# Patient Record
Sex: FEMALE | Race: WHITE | NOT HISPANIC OR LATINO | Employment: FULL TIME | URBAN - METROPOLITAN AREA
[De-identification: names, ages, dates, MRNs, and addresses within clinical notes are randomized per-mention and may not be internally consistent; named-entity substitution may affect disease eponyms.]

---

## 2017-02-07 DIAGNOSIS — F51.04 CHRONIC INSOMNIA: Primary | ICD-10-CM

## 2017-02-07 RX ORDER — ZOLPIDEM TARTRATE 5 MG/1
TABLET ORAL
Qty: 90 TABLET | Refills: 1 | Status: CANCELLED | OUTPATIENT
Start: 2017-02-07

## 2017-02-07 NOTE — TELEPHONE ENCOUNTER
Routing refill request to provider for review/approval because:  Drug not on the [facility] refill protocol     Ksenia Garcia RN, BSN  [facility]

## 2017-02-07 NOTE — TELEPHONE ENCOUNTER
Controlled Substance Refill Request for zolpidem (AMBIEN) 5 MG tablet  Problem List Complete:  Yes    Last Written Prescription Date:  7.25.16  Last Fill Quantity: 90,   # refills: 1    Last Office Visit with [facility] primary care provider: 5.4.16    Clinic visit frequency required: na     Future Office visit:     Controlled substance agreement on file: No.     Processing:  Fax Rx to listed pharmacy   checked in past 6 months?  Yes 9.23.13

## 2017-02-09 NOTE — TELEPHONE ENCOUNTER
Attempt #1.    The patient indicates understanding of these issues and agrees with the plan.  Scheduled in EP clinic for OV.  Sera Zepeda RN

## 2017-02-13 DIAGNOSIS — F32.1 MAJOR DEPRESSIVE DISORDER, SINGLE EPISODE, MODERATE (H): ICD-10-CM

## 2017-02-13 NOTE — TELEPHONE ENCOUNTER
sertraline (ZOLOFT) 100 MG tablet     Last Written Prescription Date: 8.18.16  Last Fill Quantity: 90, # refills: 1  Last Office Visit with [facility] primary care provider:  5.4.16   Next 5 appointments (look out 90 days)     Feb 14, 2017 10:40 AM CST   Office Visit with Aurelio Rivera MD   [facility] ([facility])    [address] [place] [id number] [phone]                   Last PHQ-9 score on record=   PHQ-9 SCORE 7/19/2016   Total Score -   Total Score 6

## 2017-02-14 ENCOUNTER — OFFICE VISIT (OUTPATIENT)
Dept: FAMILY MEDICINE | Facility: CLINIC | Age: 57
End: 2017-02-14
Payer: COMMERCIAL

## 2017-02-14 VITALS
WEIGHT: 189 LBS | BODY MASS INDEX: 34.78 KG/M2 | HEIGHT: 62 IN | OXYGEN SATURATION: 99 % | HEART RATE: 71 BPM | TEMPERATURE: 97.2 F | DIASTOLIC BLOOD PRESSURE: 84 MMHG | SYSTOLIC BLOOD PRESSURE: 126 MMHG

## 2017-02-14 DIAGNOSIS — F32.1 MAJOR DEPRESSIVE DISORDER, SINGLE EPISODE, MODERATE (H): Primary | ICD-10-CM

## 2017-02-14 DIAGNOSIS — E66.9 NON MORBID OBESITY, UNSPECIFIED OBESITY TYPE: ICD-10-CM

## 2017-02-14 DIAGNOSIS — Z12.31 ENCOUNTER FOR SCREENING MAMMOGRAM FOR BREAST CANCER: ICD-10-CM

## 2017-02-14 DIAGNOSIS — F51.04 CHRONIC INSOMNIA: ICD-10-CM

## 2017-02-14 PROCEDURE — 99214 OFFICE O/P EST MOD 30 MIN: CPT | Performed by: FAMILY MEDICINE

## 2017-02-14 RX ORDER — ZOLPIDEM TARTRATE 5 MG/1
2.5 TABLET ORAL
Qty: 45 TABLET | Refills: 1 | Status: SHIPPED | OUTPATIENT
Start: 2017-02-14 | End: 2017-03-14

## 2017-02-14 RX ORDER — SERTRALINE HYDROCHLORIDE 100 MG/1
150 TABLET, FILM COATED ORAL DAILY
Qty: 135 TABLET | Refills: 1 | Status: SHIPPED | OUTPATIENT
Start: 2017-02-14 | End: 2017-07-29

## 2017-02-14 ASSESSMENT — ANXIETY QUESTIONNAIRES
3. WORRYING TOO MUCH ABOUT DIFFERENT THINGS: NOT AT ALL
1. FEELING NERVOUS, ANXIOUS, OR ON EDGE: SEVERAL DAYS
2. NOT BEING ABLE TO STOP OR CONTROL WORRYING: NOT AT ALL
GAD7 TOTAL SCORE: 2
6. BECOMING EASILY ANNOYED OR IRRITABLE: NOT AT ALL
5. BEING SO RESTLESS THAT IT IS HARD TO SIT STILL: NOT AT ALL
7. FEELING AFRAID AS IF SOMETHING AWFUL MIGHT HAPPEN: SEVERAL DAYS

## 2017-02-14 ASSESSMENT — PATIENT HEALTH QUESTIONNAIRE - PHQ9: 5. POOR APPETITE OR OVEREATING: NOT AT ALL

## 2017-02-14 NOTE — MR AVS SNAPSHOT
After Visit Summary   2/14/2017    Allyn Thurman    MRN: [id number]           Patient Information     Date Of Birth          1960        Visit Information        Provider Department      2/14/2017 10:40 AM Aurelio Rivera MD [facility] [place]        Today's Diagnoses     Non morbid obesity, unspecified obesity type    -  1    Moderate Depression [296.22]        Chronic insomnia           Follow-ups after your visit        Additional Services     ENDOCRINOLOGY ADULT REFERRAL       Your provider has referred you to: [facility]: [facility] [phone]   [email or web address]      Please be aware that coverage of these services is subject to the terms and limitations of your health insurance plan.  Call member services at your health plan with any benefit or coverage questions.      Please bring the following to your appointment:    >>   Any x-rays, CTs or MRIs which have been performed.  Contact the facility where they were done to arrange for  prior to your scheduled appointment.    >>   List of current medications   >>   This referral request   >>   Any documents/labs given to you for this referral                  Follow-up notes from your care team     Return in about 4 weeks (around 3/14/2017) for Physical Exam.      Who to contact     If you have questions or need follow up information about today's clinic visit or your schedule please contact [facility] [place] directly at [phone].  Normal or non-critical lab and imaging results will be communicated to you by MyChart, letter or phone within 4 business days after the clinic has received the results. If you do not hear from us within 7 days, please contact the clinic through MyChart or phone. If you have a critical or abnormal lab result, we will notify you by phone as soon as possible.  Submit refill requests through [employer] or call your pharmacy and they will  "forward the refill request to us. Please allow 3 business days for your refill to be completed.          Additional Information About Your Visit        [employer] Information     [employer] gives you secure access to your electronic health record. If you see a primary care provider, you can also send messages to your care team and make appointments. If you have questions, please call your primary care clinic.  If you do not have a primary care provider, please call [phone] and they will assist you.        Care EveryWhere ID     This is your Care EveryWhere ID. This could be used by other organizations to access your [facility] medical records  [id number]        Your Vitals Were     Pulse Temperature Height Last Period Pulse Oximetry Breastfeeding?    71 97.2  F (36.2  C) (Tympanic) 5' 2\" (1.575 m) 01/03/2014 99% No    BMI (Body Mass Index)                   34.57 kg/m2            Blood Pressure from Last 3 Encounters:   02/14/17 126/84   05/04/16 128/76   01/20/16 110/60    Weight from Last 3 Encounters:   02/14/17 189 lb (85.7 kg)   05/04/16 182 lb (82.6 kg)   01/20/16 181 lb (82.1 kg)              We Performed the Following     ENDOCRINOLOGY ADULT REFERRAL          Today's Medication Changes          These changes are accurate as of: 2/14/17 11:06 AM.  If you have any questions, ask your nurse or doctor.               These medicines have changed or have updated prescriptions.        Dose/Directions    sertraline 100 MG tablet   Commonly known as:  ZOLOFT   This may have changed:  See the new instructions.   Used for:  Major depressive disorder, single episode, moderate (H)   Changed by:  Aurelio Rivera MD        Dose:  150 mg   Take 1.5 tablets (150 mg) by mouth daily   Quantity:  135 tablet   Refills:  1       zolpidem 5 MG tablet   Commonly known as:  AMBIEN   This may have changed:  See the new instructions.   Used for:  Chronic insomnia   Changed by:  Aurelio Rivera MD        Dose:  2.5 mg   Take 0.5 tablets " (2.5 mg) by mouth nightly as needed for sleep   Quantity:  45 tablet   Refills:  1            Where to get your medicines      These medications were sent to [facility]/pharmacy #[id number] - [place], [place] - [address]  [address] [place] [address]     Phone:  [phone]     sertraline 100 MG tablet         Some of these will need a paper prescription and others can be bought over the counter.  Ask your nurse if you have questions.     Bring a paper prescription for each of these medications     zolpidem 5 MG tablet                Primary Care Provider Office Phone # Fax #    Jovana Montes PA-C [phone]       [address] [address]        Thank you!     Thank you for choosing [facility]  for your care. Our goal is always to provide you with excellent care. Hearing back from our patients is one way we can continue to improve our services. Please take a few minutes to complete the written survey that you may receive in the mail after your visit with us. Thank you!             Your Updated Medication List - Protect others around you: Learn how to safely use, store and throw away your medicines at www.disposemymeds.org.          This list is accurate as of: 2/14/17 11:06 AM.  Always use your most recent med list.                   Brand Name Dispense Instructions for use    B Complex Tabs      Take 1 tablet by mouth daily.       CENTRUM Tabs      Take 1 tablet by mouth daily.       cetirizine 10 MG tablet    zyrTEC    30 tablet    Take 1 tablet (10 mg) by mouth every morning       ferrous sulfate Dried 160 (50 FE) MG tablet     30 tablet    Take 1 tablet by mouth daily (with breakfast).       fluticasone 50 MCG/ACT spray    FLONASE    48 g    USE 2 SPRAYS IN EACH NOSTRIL ONCE DAILY       METAMUCIL SMOOTH TEXTURE 63 % Powd   Generic drug:  psyllium     1 Bottle    Take 3 teaspoonful by mouth daily. Mix in 8 ounces of water        pantoprazole 40 MG EC tablet    PROTONIX    90 tablet    Take 1 tablet (40 mg) by mouth daily Take 30-60 minutes before a meal.       sertraline 100 MG tablet    ZOLOFT    135 tablet    Take 1.5 tablets (150 mg) by mouth daily       SUMAtriptan 50 MG tablet    IMITREX    9 tablet    Take 1 tablet (50 mg) by mouth at onset of headache for migraine May repeat dose in 2 hours if needed, max 4 tabs per 24 hours       vitamin D 2000 UNITS tablet      Take 1 tablet by mouth daily.       zolpidem 5 MG tablet    AMBIEN    45 tablet    Take 0.5 tablets (2.5 mg) by mouth nightly as needed for sleep

## 2017-02-14 NOTE — NURSING NOTE
"Chief Complaint   Patient presents with     Recheck Medication       Initial /84 (BP Location: Right arm, Patient Position: Chair, Cuff Size: Adult Regular)  Pulse 71  Temp 97.2  F (36.2  C) (Tympanic)  Ht 5' 2\" (1.575 m)  Wt 189 lb (85.7 kg)  LMP 01/03/2014  SpO2 99%  Breastfeeding? No  BMI 34.57 kg/m2 Estimated body mass index is 34.57 kg/(m^2) as calculated from the following:    Height as of this encounter: 5' 2\" (1.575 m).    Weight as of this encounter: 189 lb (85.7 kg).  BP completed using cuff size: regular    Health Maintenance Due   Topic Date Due     ADVANCE DIRECTIVE PLANNING Q5 YRS (NO INBASKET)  10/17/1979     HEPATITIS C SCREENING  10/17/1979     DEPRESSION ACTION PLAN Q1 YR (NO INBASKET)  04/07/2015     INFLUENZA VACCINE (SYSTEM ASSIGNED)  09/01/2016     PHQ-9 Q6 MONTHS (NO INBASKET)  01/14/2017         Trudy Reddy MA 2/14/17        "

## 2017-02-14 NOTE — PROGRESS NOTES
SUBJECTIVE:                                                    Allyn Thurman is a 56 year old female who presents to clinic today for the following health issues:    Medication Followup of  Ambien for insomnia. Uses 2.5 mg at night which helps. Ran out of the medication.    Taking Medication as prescribed: yes    Side Effects:  None    Medication Helping Symptoms:  yes    RESPIRATORY SYMPTOMS      Duration: 2 and a half weeks     Description  nasal congestion, rhinorrhea, cough, headache and fatigue/malaise    Severity: moderate    Accompanying signs and symptoms: None    History (predisposing factors):  none    Precipitating or alleviating factors: None    Therapies tried and outcome:  Nyquil at night.        Depression Followup    Status since last visit: Stable but not very well controlled.    See PHQ-9 for current symptoms.  Other associated symptoms: None    Complicating factors:   Significant life event:  Worries about her old parents health.   Current substance abuse:  None  Anxiety or Panic symptoms:  No    PHQ-9  English PHQ-9   Any Language        PHQ-9 SCORE 1/20/2016 7/19/2016 2/14/2017   Total Score - - -   Total Score 3 6 7       Would like to try weight loss medication.    Problem list and histories reviewed & adjusted, as indicated.  Additional history: as documented    Patient Active Problem List   Diagnosis     Vitamin D deficiency     HYPERLIPIDEMIA LDL GOAL <160     Moderate Depression [296.22]     Migraine     Chronic insomnia     Chronic rhinitis     Obesity     Normocytic anemia     Ankle sprain     Contusion     Migraine with aura and without status migrainosus, not intractable     Past Surgical History   Procedure Laterality Date     Tonsillectomy  1967     Hc stab incisions phlebect vari veins 1 ext, 10-20 inc  1990     bilateral     C tmj reconstruction  1985     right     Lasik  2005     C echo heart xthoracic,complete, w/o doppler  1/2008     normal     Colonoscopy  5/2008      normal, repeat 10 years      sleep study; attended  6/2009     AHI 0.7, RDI 4.4, max desat FIO2 90%, poor sleep architecture first 4 hours        Social History   Substance Use Topics     Smoking status: Former Smoker     Packs/day: 2.00     Years: 10.00     Types: Cigarettes     Quit date: 1/1/1989     Smokeless tobacco: Never Used     Alcohol use 0.0 oz/week     0 Standard drinks or equivalent per week      Comment: one drink per month     Family History   Problem Relation Age of Onset     Alcohol/Drug Mother      Depression Mother      attempted suicide age 76     Thyroid Disease Mother      hypothyroidism     Hypertension Mother      DIABETES Father      borderline diabetic     Eye Disorder Father      glaucoma, herpetic keratitis     Hypertension Father      DIABETES Paternal Uncle      type 2     Depression Brother      comitted suicide age 44     Respiratory Brother      sleep apnea     Respiratory Brother      asthma         Current Outpatient Prescriptions   Medication Sig Dispense Refill     sertraline (ZOLOFT) 100 MG tablet Take 1.5 tablets (150 mg) by mouth daily 135 tablet 1     zolpidem (AMBIEN) 5 MG tablet Take 0.5 tablets (2.5 mg) by mouth nightly as needed for sleep 45 tablet 1     pantoprazole (PROTONIX) 40 MG enteric coated tablet Take 1 tablet (40 mg) by mouth daily Take 30-60 minutes before a meal. 90 tablet 0     SUMAtriptan (IMITREX) 50 MG tablet Take 1 tablet (50 mg) by mouth at onset of headache for migraine May repeat dose in 2 hours if needed, max 4 tabs per 24 hours 9 tablet prn     cetirizine (ZYRTEC) 10 MG tablet Take 1 tablet (10 mg) by mouth every morning 30 tablet 1     psyllium (METAMUCIL SMOOTH TEXTURE) 63 % POWD Take 3 teaspoonful by mouth daily. Mix in 8 ounces of water 1 Bottle 11     Ferrous Sulfate Dried 160 (50 FE) MG tablet Take 1 tablet by mouth daily (with breakfast). 30 tablet prn     Multiple Vitamins-Minerals (CENTRUM) TABS Take 1 tablet by mouth daily.        "Cholecalciferol (VITAMIN D) 2000 UNIT tablet Take 1 tablet by mouth daily.       B Complex TABS Take 1 tablet by mouth daily.       [DISCONTINUED] sertraline (ZOLOFT) 100 MG tablet TAKE 1 TABLET (100 MG) BY MOUTH DAILY DUE FOR OFFICE VISIT FOR FURTHER REFILLS - 90 tablet 1     fluticasone (FLONASE) 50 MCG/ACT nasal spray USE 2 SPRAYS IN EACH NOSTRIL ONCE DAILY 48 g 2     No Known Allergies    ROS:  C: NEGATIVE for fever, chills, change in weight  E/M: NEGATIVE for ear, mouth and throat problems  R: NEGATIVE for significant cough or SOB  CV: NEGATIVE for chest pain, palpitations or peripheral edema    OBJECTIVE:                                                    /84 (BP Location: Right arm, Patient Position: Chair, Cuff Size: Adult Regular)  Pulse 71  Temp 97.2  F (36.2  C) (Tympanic)  Ht 5' 2\" (1.575 m)  Wt 189 lb (85.7 kg)  LMP 01/03/2014  SpO2 99%  Breastfeeding? No  BMI 34.57 kg/m2  Body mass index is 34.57 kg/(m^2).   GENERAL: healthy, alert, well nourished, well hydrated, no distress  HENT: ear canals- normal; TMs- normal; Nose- normal; Mouth- no ulcers, no lesions  NECK: no tenderness, no adenopathy, no asymmetry, no masses, no stiffness; thyroid- normal to palpation  RESP: lungs clear to auscultation - no rales, no rhonchi, no wheezes  CV: regular rates and rhythm, normal S1 S2, no S3 or S4 and no murmur, no click or rub -  ABDOMEN: soft, no tenderness, no  hepatosplenomegaly, no masses, normal bowel sounds  PSYCH: Alert and oriented times 3; speech- coherent , normal rate and volume; able to articulate logical thoughts, able to abstract reason, no tangential thoughts, no hallucinations or delusions, affect- normal         ASSESSMENT/PLAN:                                                      1. Moderate Depression [296.22]  Well-controlled. Increasing the dose of Zoloft from 100-100 mg daily. Follow-up in one month.   - sertraline (ZOLOFT) 100 MG tablet; Take 1.5 tablets (150 mg) by mouth daily  " Dispense: 135 tablet; Refill: 1    2. Chronic insomnia  Refill on Ambien given  - zolpidem (AMBIEN) 5 MG tablet; Take 0.5 tablets (2.5 mg) by mouth nightly as needed for sleep  Dispense: 45 tablet; Refill: 1    3. Non morbid obesity, unspecified obesity type  Referring the patient to endocrinology for weight loss management  - ENDOCRINOLOGY ADULT REFERRAL    4. Encounter for screening mammogram for breast cancer  Screening mammogram ordered  - *MA Screening Digital Bilateral; Future      Follow up with Provider - 1 month for an annual physical examination, fasting labs and her mood recheck    Total time spent was 25 minutes, more than half the time was spent in counseling the patient about the disease pathogenesis, treatment plan and coordinating care.       Aurelio Rivera MD  [facility]

## 2017-02-15 RX ORDER — SERTRALINE HYDROCHLORIDE 100 MG/1
TABLET, FILM COATED ORAL
Qty: 90 TABLET | Refills: 1 | OUTPATIENT
Start: 2017-02-15

## 2017-02-15 ASSESSMENT — ANXIETY QUESTIONNAIRES: GAD7 TOTAL SCORE: 2

## 2017-02-15 ASSESSMENT — PATIENT HEALTH QUESTIONNAIRE - PHQ9: SUM OF ALL RESPONSES TO PHQ QUESTIONS 1-9: 7

## 2017-03-14 ENCOUNTER — OFFICE VISIT (OUTPATIENT)
Dept: FAMILY MEDICINE | Facility: CLINIC | Age: 57
End: 2017-03-14
Payer: COMMERCIAL

## 2017-03-14 ENCOUNTER — RADIANT APPOINTMENT (OUTPATIENT)
Dept: MAMMOGRAPHY | Facility: CLINIC | Age: 57
End: 2017-03-14
Attending: FAMILY MEDICINE
Payer: COMMERCIAL

## 2017-03-14 VITALS
TEMPERATURE: 98.6 F | WEIGHT: 185 LBS | OXYGEN SATURATION: 98 % | SYSTOLIC BLOOD PRESSURE: 110 MMHG | HEART RATE: 71 BPM | BODY MASS INDEX: 34.04 KG/M2 | DIASTOLIC BLOOD PRESSURE: 64 MMHG | HEIGHT: 62 IN

## 2017-03-14 DIAGNOSIS — Z12.31 ENCOUNTER FOR SCREENING MAMMOGRAM FOR BREAST CANCER: ICD-10-CM

## 2017-03-14 DIAGNOSIS — F51.04 CHRONIC INSOMNIA: ICD-10-CM

## 2017-03-14 DIAGNOSIS — I78.1 NEVUS, NON-NEOPLASTIC: ICD-10-CM

## 2017-03-14 DIAGNOSIS — L72.9 CYST OF SUBCUTANEOUS TISSUE: ICD-10-CM

## 2017-03-14 DIAGNOSIS — Z00.00 ROUTINE GENERAL MEDICAL EXAMINATION AT A HEALTH CARE FACILITY: Primary | ICD-10-CM

## 2017-03-14 DIAGNOSIS — G43.109 MIGRAINE WITH AURA AND WITHOUT STATUS MIGRAINOSUS, NOT INTRACTABLE: ICD-10-CM

## 2017-03-14 DIAGNOSIS — Z71.89 ADVANCED DIRECTIVES, COUNSELING/DISCUSSION: ICD-10-CM

## 2017-03-14 DIAGNOSIS — F32.1 MAJOR DEPRESSIVE DISORDER, SINGLE EPISODE, MODERATE (H): ICD-10-CM

## 2017-03-14 LAB
CHOLEST SERPL-MCNC: 188 MG/DL
GLUCOSE SERPL-MCNC: 93 MG/DL (ref 70–99)
HCV AB SERPL QL IA: NORMAL
HDLC SERPL-MCNC: 64 MG/DL
HGB BLD-MCNC: 11.8 G/DL (ref 11.7–15.7)
LDLC SERPL CALC-MCNC: 113 MG/DL
NONHDLC SERPL-MCNC: 124 MG/DL
TRIGL SERPL-MCNC: 54 MG/DL

## 2017-03-14 PROCEDURE — 85018 HEMOGLOBIN: CPT | Performed by: FAMILY MEDICINE

## 2017-03-14 PROCEDURE — 82947 ASSAY GLUCOSE BLOOD QUANT: CPT | Performed by: FAMILY MEDICINE

## 2017-03-14 PROCEDURE — 86803 HEPATITIS C AB TEST: CPT | Performed by: FAMILY MEDICINE

## 2017-03-14 PROCEDURE — 99396 PREV VISIT EST AGE 40-64: CPT | Performed by: FAMILY MEDICINE

## 2017-03-14 PROCEDURE — 80061 LIPID PANEL: CPT | Performed by: FAMILY MEDICINE

## 2017-03-14 PROCEDURE — 99212 OFFICE O/P EST SF 10 MIN: CPT | Mod: 25 | Performed by: FAMILY MEDICINE

## 2017-03-14 PROCEDURE — G0202 SCR MAMMO BI INCL CAD: HCPCS | Mod: TC

## 2017-03-14 PROCEDURE — 36415 COLL VENOUS BLD VENIPUNCTURE: CPT | Performed by: FAMILY MEDICINE

## 2017-03-14 RX ORDER — SUMATRIPTAN 50 MG/1
50 TABLET, FILM COATED ORAL
Qty: 9 TABLET | Refills: 3 | Status: SHIPPED | OUTPATIENT
Start: 2017-03-14 | End: 2018-05-08

## 2017-03-14 RX ORDER — ZOLPIDEM TARTRATE 5 MG/1
5 TABLET ORAL
Qty: 90 TABLET | Refills: 1 | Status: SHIPPED | OUTPATIENT
Start: 2017-03-14 | End: 2017-09-30

## 2017-03-14 ASSESSMENT — ANXIETY QUESTIONNAIRES
6. BECOMING EASILY ANNOYED OR IRRITABLE: NOT AT ALL
GAD7 TOTAL SCORE: 2
3. WORRYING TOO MUCH ABOUT DIFFERENT THINGS: NOT AT ALL
7. FEELING AFRAID AS IF SOMETHING AWFUL MIGHT HAPPEN: NOT AT ALL
5. BEING SO RESTLESS THAT IT IS HARD TO SIT STILL: NOT AT ALL
1. FEELING NERVOUS, ANXIOUS, OR ON EDGE: NOT AT ALL
2. NOT BEING ABLE TO STOP OR CONTROL WORRYING: SEVERAL DAYS

## 2017-03-14 ASSESSMENT — PATIENT HEALTH QUESTIONNAIRE - PHQ9: 5. POOR APPETITE OR OVEREATING: SEVERAL DAYS

## 2017-03-14 NOTE — LETTER
[facility]          [address] [place] [address]                            [phone]  Fax: [phone]  March 14, 2017     Allyn Thurman  [address] [place] [address]      Dear Allyn,    I have reviewed your recent labs. Here are the results:    Your LDL is flagged as being high. However, this cut off is only for people with Diabetes Mellitus, known heart disease, or a 10-year risk for significant heart disease of greater than 10%.     In other individuals our goals for LDL cholesterol are higher, so I am happy with where yours is.     The mediterranean diet has been shown to reduce LDL cholesterol and has also been shown to reduce the risk of cardiovascular disease, cancer, and Alzheimer's dementia. This diet focuses on healthy fats (monounsatruated and polyunsaturated) such that are found in fish, extra virgin olive oil, nuts, and avocados. It is also high in fruits & vegetables (7 servings per day) and whole grains. Regular exercise is also important in reducing cholesterol.     -Hemoglobin is normal.  There is no evidence of anemia.  -Glucose (diabetic screening test) is normal.  -Hepatitis C antibody screen test shows no signs of a previous hepatitis C infection.    Results for orders placed or performed in visit on 03/14/17   Glucose   Result Value Ref Range    Glucose 93 70 - 99 mg/dL   Hepatitis C Screen Reflex to HCV RNA Quant and Genotype   Result Value Ref Range    Hepatitis C Antibody  NR     Nonreactive   Assay performance characteristics have not been established for newborns,   infants, and children     Lipid Profile   Result Value Ref Range    Cholesterol 188 <200 mg/dL    Triglycerides 54 <150 mg/dL    HDL Cholesterol 64 >49 mg/dL    LDL Cholesterol Calculated 113 (H) <100 mg/dL    Non HDL Cholesterol 124 <130 mg/dL   Hemoglobin   Result Value Ref Range    Hemoglobin 11.8 11.7 - 15.7 g/dL          Thank you for choosing [facility].  We appreciate the opportunity to serve you and look forward to supporting your healthcare needs in the future.    If you have any questions or concerns, please call me or my staff at [phone].      Sincerely,      Miguel Carey M.D.

## 2017-03-14 NOTE — MR AVS SNAPSHOT
After Visit Summary   3/14/2017    Allyn Thurman    MRN: [id number]           Patient Information     Date Of Birth          1960        Visit Information        Provider Department      3/14/2017 8:20 AM Aurelio Rivera [place] [facility]        Today's Diagnoses     Routine general medical examination at a health care facility    -  1    Moderate Depression [296.22]        Chronic insomnia        Nevus, non-neoplastic        Migraine with aura and without status migrainosus, not intractable          Care Instructions      Preventive Health Recommendations  Female Ages 50 - 64    Yearly exam: See your health care provider every year in order to  o Review health changes.   o Discuss preventive care.    o Review your medicines if your doctor has prescribed any.      Get a Pap test every three years (unless you have an abnormal result and your provider advises testing more often).    If you get Pap tests with HPV test, you only need to test every 5 years, unless you have an abnormal result.     You do not need a Pap test if your uterus was removed (hysterectomy) and you have not had cancer.    You should be tested each year for STDs (sexually transmitted diseases) if you're at risk.     Have a mammogram every 1 to 2 years.    Have a colonoscopy at age 50, or have a yearly FIT test (stool test). These exams screen for colon cancer.      Have a cholesterol test every 5 years, or more often if advised.    Have a diabetes test (fasting glucose) every three years. If you are at risk for diabetes, you should have this test more often.     If you are at risk for osteoporosis (brittle bone disease), think about having a bone density scan (DEXA).    Shots: Get a flu shot each year. Get a tetanus shot every 10 years.    Nutrition:     Eat at least 5 servings of fruits and vegetables each day.    Eat whole-grain bread, whole-wheat pasta and brown rice instead of white grains and rice.    Talk  to your provider about Calcium and Vitamin D.     Lifestyle    Exercise at least 150 minutes a week (30 minutes a day, 5 days a week). This will help you control your weight and prevent disease.    Limit alcohol to one drink per day.    No smoking.     Wear sunscreen to prevent skin cancer.     See your dentist every six months for an exam and cleaning.    See your eye doctor every 1 to 2 years.          Follow-ups after your visit        Additional Services     DERMATOLOGY REFERRAL       Your provider has referred you to: [facility]: [facility] [place] [phone]   [email or web address]    Please be aware that coverage of these services is subject to the terms and limitations of your health insurance plan.  Call member services at your health plan with any benefit or coverage questions.      Please bring the following with you to your appointment:    (1) Any X-Rays, CTs or MRIs which have been performed.  Contact the facility where they were done to arrange for  prior to your scheduled appointment.    (2) List of current medications  (3) This referral request   (4) Any documents/labs given to you for this referral                  Your next 10 appointments already scheduled     Mar 14, 2017  9:15 AM CDT   MA SCREENING DIGITAL BILATERAL with ECMA1   [facility] [place] ([facility] [place])    [address] [place] [id number] [phone]           Do not use any powder, lotion or deodorant under your arms or on your breast. If you do, we will ask you to remove it before your exam.  Wear comfortable, two-piece clothing.  If you have any allergies, tell your care team.  Bring any previous mammograms from other facilities or have them mailed to the breast center.              Who to contact     If you have questions or need follow up information about today's clinic visit or your schedule please contact [facility] [place] directly  "at [phone].  Normal or non-critical lab and imaging results will be communicated to you by [employer], letter or phone within 4 business days after the clinic has received the results. If you do not hear from us within 7 days, please contact the clinic through [employer] or phone. If you have a critical or abnormal lab result, we will notify you by phone as soon as possible.  Submit refill requests through [employer] or call your pharmacy and they will forward the refill request to us. Please allow 3 business days for your refill to be completed.          Additional Information About Your Visit        [employer] Information     [employer] lets you send messages to your doctor, view your test results, renew your prescriptions, schedule appointments and more. To sign up, go to [email or web address] . Click on \"Log in\" on the left side of the screen, which will take you to the Welcome page. Then click on \"Sign up Now\" on the right side of the page.     You will be asked to enter the access code listed below, as well as some personal information. Please follow the directions to create your username and password.     Your access code is: [email or web address]  Expires: 2017  8:52 AM     Your access code will  in 90 days. If you need help or a new code, please call your [place] clinic or [phone].        Care EveryWhere ID     This is your Care EveryWhere ID. This could be used by other organizations to access your [facility] medical records  [id number]        Your Vitals Were     Pulse Temperature Height Last Period Pulse Oximetry Breastfeeding?    71 98.6  F (37  C) (Oral) 5' 2\" (1.575 m) 2014 98% No    BMI (Body Mass Index)                   33.84 kg/m2            Blood Pressure from Last 3 Encounters:   17 110/64   17 126/84   16 128/76    Weight from Last 3 Encounters:   17 185 lb (83.9 kg)   17 189 lb (85.7 kg)   16 182 lb (82.6 kg)              We Performed the Following     " DERMATOLOGY REFERRAL     Glucose     Hemoglobin     Hepatitis C Screen Reflex to HCV RNA Quant and Genotype     Lipid Profile          Today's Medication Changes          These changes are accurate as of: 3/14/17  8:53 AM.  If you have any questions, ask your nurse or doctor.               These medicines have changed or have updated prescriptions.        Dose/Directions    zolpidem 5 MG tablet   Commonly known as:  AMBIEN   This may have changed:  how much to take   Used for:  Chronic insomnia   Changed by:  Aurelio Rivera MD        Dose:  5 mg   Take 1 tablet (5 mg) by mouth nightly as needed for sleep   Quantity:  90 tablet   Refills:  1         Stop taking these medicines if you haven't already. Please contact your care team if you have questions.     pantoprazole 40 MG EC tablet   Commonly known as:  PROTONIX   Stopped by:  Aurelio Rivera MD                Where to get your medicines      These medications were sent to [facility]/pharmacy #[id number] - [place], [place] - [address]  [address], [facility] [address]     Phone:  [phone]     SUMAtriptan 50 MG tablet         Some of these will need a paper prescription and others can be bought over the counter.  Ask your nurse if you have questions.     Bring a paper prescription for each of these medications     zolpidem 5 MG tablet                Primary Care Provider Office Phone # Fax #    Aurelio Rivera [phone]       [address]  [place] [address]        Thank you!     Thank you for [place] [facility]  [place]. Our goal is always to provide you with excellent care. Hearing back from our patients is one way we can continue to improve our services. Please take a few minutes to complete the written survey that you may receive in the mail after your visit with us. Thank you!             Your Updated Medication List - Protect others around you: Learn how to safely use,  store and throw away your medicines at www.disposemymeds.org.          This list is accurate as of: 3/14/17  8:53 AM.  Always use your most recent med list.                   Brand Name Dispense Instructions for use    B Complex Tabs      Take 1 tablet by mouth daily.       CENTRUM Tabs      Take 1 tablet by mouth daily.       cetirizine 10 MG tablet    zyrTEC    30 tablet    Take 1 tablet (10 mg) by mouth every morning       ferrous sulfate Dried 160 (50 FE) MG tablet     30 tablet    Take 1 tablet by mouth daily (with breakfast).       fluticasone 50 MCG/ACT spray    FLONASE    48 g    USE 2 SPRAYS IN EACH NOSTRIL ONCE DAILY       METAMUCIL SMOOTH TEXTURE 63 % Powd   Generic drug:  psyllium     1 Bottle    Take 3 teaspoonful by mouth daily. Mix in 8 ounces of water       sertraline 100 MG tablet    ZOLOFT    135 tablet    Take 1.5 tablets (150 mg) by mouth daily       SUMAtriptan 50 MG tablet    IMITREX    9 tablet    Take 1 tablet (50 mg) by mouth at onset of headache for migraine May repeat dose in 2 hours if needed, max 4 tabs per 24 hours       vitamin D 2000 UNITS tablet      Take 1 tablet by mouth daily.       zolpidem 5 MG tablet    AMBIEN    90 tablet    Take 1 tablet (5 mg) by mouth nightly as needed for sleep

## 2017-03-14 NOTE — LETTER
My Depression Action Plan  Name: Allyn Thurman   Date of Birth 1960  Date: 3/14/2017    My doctor: Aurelio Rivera   My clinic: [address] [address]  [phone]          GREEN    ZONE   Good Control    What it looks like:     Things are going generally well. You have normal up s and down s. You may even feel depressed from time to time, but bad moods usually last less than a day.   What you need to do:  1. Continue to care for yourself (see self care plan)  2. Check your depression survival kit and update it as needed  3. Follow your physician s recommendations including any medication.  4. Do not stop taking medication unless you consult with your physician first.           YELLOW         ZONE Getting Worse    What it looks like:     Depression is starting to interfere with your life.     It may be hard to get out of bed; you may be starting to isolate yourself from others.    Symptoms of depression are starting to last most all day and this has happened for several days.     You may have suicidal thoughts but they are not constant.   What you need to do:     1. Call your care team, your response to treatment will improve if you keep your care team informed of your progress. Yellow periods are signs an adjustment may need to be made.     2. Continue your self-care, even if you have to fake it!    3. Talk to someone in your support network    4. Open up your depression survival kit           RED    ZONE Medical Alert - Get Help    What it looks like:     Depression is seriously interfering with your life.     You may experience these or other symptoms: You can t get out of bed most days, can t work or engage in other necessary activities, you have trouble taking care of basic hygiene, or basic responsibilities, thoughts of suicide or death that will not go away, self-injurious behavior.     What you need to do:  1. Call your care  team and request a same-day appointment. If they are not available (weekends or after hours) call your local crisis line, emergency room or 911.      Electronically signed by: Sarah J. Severson, March 14, 2017    Depression Self Care Plan / Survival Kit    Self-Care for Depression  Here s the deal. Your body and mind are really not as separate as most people think.  What you do and think affects how you feel and how you feel influences what you do and think. This means if you do things that people who feel good do, it will help you feel better.  Sometimes this is all it takes.  There is also a place for medication and therapy depending on how severe your depression is, so be sure to consult with your medical provider and/ or Behavioral Health Consultant if your symptoms are worsening or not improving.     In order to better manage my stress, I will:    Exercise  Get some form of exercise, every day. This will help reduce pain and release endorphins, the  feel good  chemicals in your brain. This is almost as good as taking antidepressants!  This is not the same as joining a gym and then never going! (they count on that by the way ) It can be as simple as just going for a walk or doing some gardening, anything that will get you moving.      Hygiene   Maintain good hygiene (Get out of bed in the morning, Make your bed, Brush your teeth, Take a shower, and Get dressed like you were going to work, even if you are unemployed).  If your clothes don't fit try to get ones that do.    Diet  I will strive to eat foods that are good for me, drink plenty of water, and avoid excessive sugar, caffeine, alcohol, and other mood-altering substances.  Some foods that are helpful in depression are: complex carbohydrates, B vitamins, flaxseed, fish or fish oil, fresh fruits and vegetables.    Psychotherapy  I agree to participate in Individual Therapy (if recommended).    Medication  If prescribed medications, I agree to take them.   Missing doses can result in serious side effects.  I understand that drinking alcohol, or other illicit drug use, may cause potential side effects.  I will not stop my medication abruptly without first discussing it with my provider.    Staying Connected With Others  I will stay in touch with my friends, family members, and my primary care provider/team.    Use your imagination  Be creative.  We all have a creative side; it doesn t matter if it s oil painting, sand castles, or mud pies! This will also kick up the endorphins.    Witness Beauty  (AKA stop and smell the roses) Take a look outside, even in mid-winter. Notice colors, textures. Watch the squirrels and birds.     Service to others  Be of service to others.  There is always someone else in need.  By helping others we can  get out of ourselves  and remember the really important things.  This also provides opportunities for practicing all the other parts of the program.    Humor  Laugh and be silly!  Adjust your TV habits for less news and crime-drama and more comedy.    Control your stress  Try breathing deep, massage therapy, biofeedback, and meditation. Find time to relax each day.     My support system    Clinic Contact:  Phone number:    Contact 1:  Phone number:    Contact 2:  Phone number:    [employer]/:  Phone number:    Therapist:  Phone number:    Local crisis center:    Phone number:    Other community support:  Phone number:

## 2017-03-14 NOTE — NURSING NOTE
"Chief Complaint   Patient presents with     Physical     fasting       Initial /64 (BP Location: Right arm, Patient Position: Chair, Cuff Size: Adult Regular)  Pulse 71  Temp 98.6  F (37  C) (Oral)  Ht 5' 2\" (1.575 m)  Wt 185 lb (83.9 kg)  LMP 01/03/2014  SpO2 98%  Breastfeeding? No  BMI 33.84 kg/m2 Estimated body mass index is 33.84 kg/(m^2) as calculated from the following:    Height as of this encounter: 5' 2\" (1.575 m).    Weight as of this encounter: 185 lb (83.9 kg).  Medication Reconciliation: complete  "

## 2017-03-14 NOTE — PROGRESS NOTES
SUBJECTIVE:     CC: Allyn Thurman is an 56 year old woman who presents for preventive health visit.     Healthy Habits:    Do you get at least three servings of calcium containing foods daily (dairy, green leafy vegetables, etc.)? yes    Amount of exercise or daily activities, outside of work: 3 day(s) per week    Problems taking medications regularly No    Medication side effects: No    Have you had an eye exam in the past two years? no    Do you see a dentist twice per year? yes    Do you have sleep apnea, excessive snoring or daytime drowsiness?no    Lump on behind right knee for 1 years  New mole onchin    Depression Followup    Status since last visit: Improved with the higher dose of Zoloft 150 mg QD.    See PHQ-9 for current symptoms.  Other associated symptoms: None    Complicating factors:   Significant life event:  No   Current substance abuse:  None  Anxiety or Panic symptoms:  No    PHQ-9  English PHQ-9   Any Language        Insomnia- not well managed with 2.5 mg of Ambien.     Complain of a new mole on the chin that appeared one year ago. Denies any changes since then. Would like to see a dermatologist for that. No other rashes or moles on the body reported.    Complain of feeling a lump behind her right knee on the outer side for the last 1 year. Denies any swelling or changes of size or redness. It hurts if she pushes on it. Denies any trauma. Denies any discomfort with walking. No associated numbness or tingling.      Today's PHQ-2 Score:   PHQ-2 ( 1999 Pfizer) 3/14/2017 5/4/2016   Q1: Little interest or pleasure in doing things 0 0   Q2: Feeling down, depressed or hopeless 0 0   PHQ-2 Score 0 0       Abuse: Current or Past(Physical, Sexual or Emotional)- No  Do you feel safe in your environment - Yes    Social History   Substance Use Topics     Smoking status: Former Smoker     Packs/day: 2.00     Years: 10.00     Types: Cigarettes     Quit date: 1/1/1989     Smokeless tobacco: Never Used      Alcohol use 0.0 oz/week     0 Standard drinks or equivalent per week      Comment: one drink per month     The patient does not drink >3 drinks per day nor >7 drinks per week.    Recent Labs   Lab Test  01/20/16   0852  09/23/13   0937  08/17/12   0905   CHOL  207*  190  205*   HDL  68  54  55   LDL  122*  123  134*   TRIG  85  69  79   CHOLHDLRATIO   --   3.5  3.7   NHDL  139*   --    --        Reviewed orders with patient.  Reviewed health maintenance and updated orders accordingly - Yes    Mammo Decision Support:  Patient over age 50, mutual decision to screen reflected in health maintenance.    Pertinent mammograms are reviewed under the imaging tab.  History of abnormal Pap smear: NO - age 30- 65 PAP every 3 years recommended    Reviewed and updated as needed this visit by clinical staff  Tobacco  Allergies  Meds         Reviewed and updated as needed this visit by Provider            ROS:  C: NEGATIVE for fever, chills, change in weight  I: NEGATIVE for worrisome rashes, moles or lesions  E: NEGATIVE for vision changes or irritation  ENT: NEGATIVE for ear, mouth and throat problems  R: NEGATIVE for significant cough or SOB  B: NEGATIVE for masses, tenderness or discharge  CV: NEGATIVE for chest pain, palpitations or peripheral edema  GI: NEGATIVE for nausea, abdominal pain, heartburn, or change in bowel habits  : NEGATIVE for unusual urinary or vaginal symptoms. No vaginal bleeding.  M: NEGATIVE for significant arthralgias or myalgia  N: NEGATIVE for weakness, dizziness or paresthesias  P: NEGATIVE for changes in mood or affect     Patient Active Problem List   Diagnosis     Vitamin D deficiency     HYPERLIPIDEMIA LDL GOAL <160     Moderate Depression [296.22]     Migraine     Chronic insomnia     Chronic rhinitis     Obesity     Normocytic anemia     Ankle sprain     Contusion     Migraine with aura and without status migrainosus, not intractable     Advanced directives, counseling/discussion     Past  Surgical History   Procedure Laterality Date     Tonsillectomy  1967      stab incisions phlebect vari veins 1 ext, 10-20 inc  1990     bilateral     C tmj reconstruction  1985     right     Lasik  2005     C echo heart xthoracic,complete, w/o doppler  1/2008     normal     Colonoscopy  5/2008     normal, repeat 10 years     Hc sleep study; attended  6/2009     AHI 0.7, RDI 4.4, max desat FIO2 90%, poor sleep architecture first 4 hours        Social History   Substance Use Topics     Smoking status: Former Smoker     Packs/day: 2.00     Years: 10.00     Types: Cigarettes     Quit date: 1/1/1989     Smokeless tobacco: Never Used     Alcohol use 0.0 oz/week     0 Standard drinks or equivalent per week      Comment: one drink per month     Family History   Problem Relation Age of Onset     Alcohol/Drug Mother      Depression Mother      attempted suicide age 76     Thyroid Disease Mother      hypothyroidism     Hypertension Mother      DIABETES Father      borderline diabetic     Eye Disorder Father      glaucoma, herpetic keratitis     Hypertension Father      DIABETES Paternal Uncle      type 2     Depression Brother      comitted suicide age 44     Respiratory Brother      sleep apnea     Respiratory Brother      asthma         Current Outpatient Prescriptions   Medication Sig Dispense Refill     zolpidem (AMBIEN) 5 MG tablet Take 1 tablet (5 mg) by mouth nightly as needed for sleep 90 tablet 1     SUMAtriptan (IMITREX) 50 MG tablet Take 1 tablet (50 mg) by mouth at onset of headache for migraine May repeat dose in 2 hours if needed, max 4 tabs per 24 hours 9 tablet 3     sertraline (ZOLOFT) 100 MG tablet Take 1.5 tablets (150 mg) by mouth daily 135 tablet 1     fluticasone (FLONASE) 50 MCG/ACT nasal spray USE 2 SPRAYS IN EACH NOSTRIL ONCE DAILY 48 g 2     cetirizine (ZYRTEC) 10 MG tablet Take 1 tablet (10 mg) by mouth every morning 30 tablet 1     psyllium (METAMUCIL SMOOTH TEXTURE) 63 % POWD Take 3 teaspoonful  "by mouth daily. Mix in 8 ounces of water 1 Bottle 11     Ferrous Sulfate Dried 160 (50 FE) MG tablet Take 1 tablet by mouth daily (with breakfast). 30 tablet prn     Multiple Vitamins-Minerals (CENTRUM) TABS Take 1 tablet by mouth daily.       Cholecalciferol (VITAMIN D) 2000 UNIT tablet Take 1 tablet by mouth daily.       B Complex TABS Take 1 tablet by mouth daily.       No Known Allergies  OBJECTIVE:     /64 (BP Location: Right arm, Patient Position: Chair, Cuff Size: Adult Regular)  Pulse 71  Temp 98.6  F (37  C) (Oral)  Ht 5' 2\" (1.575 m)  Wt 185 lb (83.9 kg)  LMP 01/03/2014  SpO2 98%  Breastfeeding? No  BMI 33.84 kg/m2  EXAM:  GENERAL APPEARANCE: healthy, alert and no distress  EYES: Eyes grossly normal to inspection, PERRL and conjunctivae and sclerae normal  HENT: ear canals and TM's normal, nose and mouth without ulcers or lesions, oropharynx clear and oral mucous membranes moist  NECK: no adenopathy, no asymmetry, masses, or scars and thyroid normal to palpation  RESP: lungs clear to auscultation - no rales, rhonchi or wheezes  BREAST: normal without masses, tenderness or nipple discharge and no palpable axillary masses or adenopathy  CV: regular rate and rhythm, normal S1 S2, no S3 or S4, no murmur, click or rub, no peripheral edema and peripheral pulses strong  ABDOMEN: soft, nontender, no hepatosplenomegaly, no masses and bowel sounds normal  MS: no musculoskeletal defects are noted and gait is age appropriate without ataxia  Right knee, posteriorly on the lateral aspect there is noted to be a subcutaneous lump which is slightly tender. No erythema or warmth or fluctuation. It is about 1 cm in size.  Range of motion of the right knee is normal.  SKIN: Dark brown Mole noted on the chin.  NEURO: Normal strength and tone, sensory exam grossly normal, mentation intact and speech normal  PSYCH: mentation appears normal and affect normal/bright    ASSESSMENT/PLAN:     1. Routine general medical " "examination at a health care facility  Screening labs ordered. Patient is fasting today  - Glucose  - Hepatitis C Screen Reflex to HCV RNA Quant and Genotype  - Lipid Profile  - Hemoglobin    2. Moderate Depression [296.22]  Well-controlled. Resume Zoloft 150 mg daily.    3. Chronic insomnia  Increase the dose of Ambien to 5 mg at night as needed. Symptoms were not well-managed on 2.5 mg of Ambien   - zolpidem (AMBIEN) 5 MG tablet; Take 1 tablet (5 mg) by mouth nightly as needed for sleep  Dispense: 90 tablet; Refill: 1    4. Migraine with aura and without status migrainosus, not intractable  Well-managed. Imitrex reordered  - SUMAtriptan (IMITREX) 50 MG tablet; Take 1 tablet (50 mg) by mouth at onset of headache for migraine May repeat dose in 2 hours if needed, max 4 tabs per 24 hours  Dispense: 9 tablet; Refill: 3    5. Nevus, non-neoplastic  Recommended to see dermatology. Referral given  - DERMATOLOGY REFERRAL    6. Cyst of subcutaneous tissue  The lump behind the right knee on the lateral aspect is a likely a subcutaneous cyst which is noninflamed. Patient reassured. If any changes in signs or symptoms noted, instructed to follow up    7. Advanced directives, counseling/discussion        COUNSELING:   Reviewed preventive health counseling, as reflected in patient instructions       Regular exercise       Healthy diet/nutrition         reports that she quit smoking about 28 years ago. Her smoking use included Cigarettes. She has a 20.00 pack-year smoking history. She has never used smokeless tobacco.    Estimated body mass index is 33.84 kg/(m^2) as calculated from the following:    Height as of this encounter: 5' 2\" (1.575 m).    Weight as of this encounter: 185 lb (83.9 kg).   Weight management plan: Discussed healthy diet and exercise guidelines and patient will follow up in 12 months in clinic to re-evaluate.    Counseling Resources:  ATP IV Guidelines  Pooled Cohorts Equation Calculator  Breast Cancer Risk " Calculator  FRAX Risk Assessment  ICSI Preventive Guidelines  Dietary Guidelines for Americans, 2010  USDA's MyPlate  ASA Prophylaxis  Lung CA Screening    Aurelio Rivera MD  [facility] [place]

## 2017-03-15 ASSESSMENT — PATIENT HEALTH QUESTIONNAIRE - PHQ9: SUM OF ALL RESPONSES TO PHQ QUESTIONS 1-9: 3

## 2017-03-15 ASSESSMENT — ANXIETY QUESTIONNAIRES: GAD7 TOTAL SCORE: 2

## 2017-07-29 DIAGNOSIS — F32.1 MAJOR DEPRESSIVE DISORDER, SINGLE EPISODE, MODERATE (H): ICD-10-CM

## 2017-07-31 NOTE — TELEPHONE ENCOUNTER
Sertraline     Last Written Prescription Date: 2/14/17  Last Fill Quantity: 135, # refills: 1  Last Office Visit with G primary care provider:  3/14/17        Last PHQ-9 score on record=   PHQ-9 SCORE 3/14/2017   Total Score -   Total Score 3       Hilda James CMA

## 2017-08-01 RX ORDER — SERTRALINE HYDROCHLORIDE 100 MG/1
TABLET, FILM COATED ORAL
Qty: 135 TABLET | Refills: 0 | Status: SHIPPED | OUTPATIENT
Start: 2017-08-01 | End: 2017-10-27

## 2017-08-01 NOTE — TELEPHONE ENCOUNTER
Medication is being filled for 1 time refill only due to:  due for appt in 3 months. Allyn Claros RN  OV due 9/14/17.js

## 2017-09-30 DIAGNOSIS — F51.04 CHRONIC INSOMNIA: ICD-10-CM

## 2017-10-02 RX ORDER — ZOLPIDEM TARTRATE 5 MG/1
TABLET ORAL
Qty: 90 TABLET | Refills: 1 | Status: SHIPPED | OUTPATIENT
Start: 2017-10-02 | End: 2018-05-08

## 2017-10-02 NOTE — TELEPHONE ENCOUNTER
Zolpidem      Last Written Prescription Date:  3/14/17  Last Fill Quantity: 90,   # refills: 1  Last Office Visit with [facility] provider: 3/14/17  Future Office visit:       Routing refill request to provider for review/approval because:  Drug not on the [facility] refill protocol or controlled substance    Hilda James CMA

## 2017-10-27 DIAGNOSIS — F32.1 MAJOR DEPRESSIVE DISORDER, SINGLE EPISODE, MODERATE (H): ICD-10-CM

## 2017-10-27 NOTE — TELEPHONE ENCOUNTER
PHQ-9 SCORE 7/19/2016 2/14/2017 3/14/2017   Total Score - - -   Total Score 6 7 3     Need to update PHQ-9.  Left message to return call  Marika Dillard RN - Triage  [facility]

## 2017-10-30 RX ORDER — SERTRALINE HYDROCHLORIDE 100 MG/1
TABLET, FILM COATED ORAL
Qty: 135 TABLET | Refills: 1 | Status: SHIPPED | OUTPATIENT
Start: 2017-10-30 | End: 2018-04-18

## 2017-10-30 ASSESSMENT — PATIENT HEALTH QUESTIONNAIRE - PHQ9: SUM OF ALL RESPONSES TO PHQ QUESTIONS 1-9: 4

## 2017-10-30 NOTE — TELEPHONE ENCOUNTER
PHQ-9 SCORE 2/14/2017 3/14/2017 10/30/2017   Total Score - - -   Total Score 7 3 4       Prescription approved per [facility], [facility] or [facility] refill protocol.  Marika Dillard RN - Triage  [facility]

## 2017-11-22 DIAGNOSIS — J31.0 CHRONIC RHINITIS: ICD-10-CM

## 2017-11-24 RX ORDER — FLUTICASONE PROPIONATE 50 MCG
SPRAY, SUSPENSION (ML) NASAL
Qty: 48 ML | Refills: 1 | Status: SHIPPED | OUTPATIENT
Start: 2017-11-24 | End: 2018-05-08

## 2017-11-24 NOTE — TELEPHONE ENCOUNTER
Dx is chronic rhinitis, not asthma.        Requested Prescriptions   Pending Prescriptions Disp Refills     fluticasone (FLONASE) 50 MCG/ACT spray [Pharmacy Med Name: FLUTICASONE PROP 50 MCG SPRAY] 48 mL 1     Sig: USE 2 SPRAYS IN EACH NOSTRIL ONCE DAILY    Inhaled Steroids Protocol Failed    11/22/2017  9:20 PM       Failed - Recent or future visit with authorizing provider's specialty    Patient had office visit in the last year or has a visit in the next 30 days with authorizing provider.  See chart review.              Passed - Patient is age 12 or older        Refill approved through [facility] protocol.  Belinda Rodriguez RN  [facility]  [phone]

## 2018-01-10 ENCOUNTER — TRANSFERRED RECORDS (OUTPATIENT)
Dept: HEALTH INFORMATION MANAGEMENT | Facility: CLINIC | Age: 58
End: 2018-01-10

## 2018-04-18 DIAGNOSIS — F32.1 MAJOR DEPRESSIVE DISORDER, SINGLE EPISODE, MODERATE (H): ICD-10-CM

## 2018-04-18 RX ORDER — SERTRALINE HYDROCHLORIDE 100 MG/1
TABLET, FILM COATED ORAL
Qty: 45 TABLET | Refills: 0 | Status: SHIPPED | OUTPATIENT
Start: 2018-04-18 | End: 2018-05-08

## 2018-04-18 NOTE — TELEPHONE ENCOUNTER
"Requested Prescriptions   Pending Prescriptions Disp Refills     sertraline (ZOLOFT) 100 MG tablet [Pharmacy Med Name: SERTRALINE  MG TABLET]  Last Written Prescription Date:  10-  Last Fill Quantity: 135 tablet,  # refills: 1   Last office visit: 3/14/2017 with prescribing provider:  3-   Future Office Visit:     135 tablet 1     Sig: TAKE 1.5 TABLETS (150 MG) BY MOUTH DAILY    SSRIs Protocol Failed    4/18/2018  2:20 AM       Failed - Recent (6 mo) or future (30 days) visit within the authorizing provider's specialty    Patient had office visit in the last 6 months or has a visit in the next 30 days with authorizing provider or within the authorizing provider's specialty.  See \"Patient Info\" tab in inbasket, or \"Choose Columns\" in Meds & Orders section of the refill encounter.           Passed - PHQ-9 score less than 5 in past 6 months    Please review last PHQ-9 score.          Passed - Patient is age 18 or older       Passed - No active pregnancy on record       Passed - No positive pregnancy test in last 12 months            "

## 2018-04-18 NOTE — TELEPHONE ENCOUNTER
Left message for pt to call back.    Pt is due for an office visit with provider.    Hilda James CMA

## 2018-04-18 NOTE — TELEPHONE ENCOUNTER
PHQ-9 SCORE 2/14/2017 3/14/2017 10/30/2017   Total Score - - -   Total Score 7 3 4     30 day supply given.  Patient is due for an OV.  Please call and assist with scheduling appointment prior to next refill   Marika Dillard RN - Triage  [facility]

## 2018-05-08 ENCOUNTER — OFFICE VISIT (OUTPATIENT)
Dept: FAMILY MEDICINE | Facility: CLINIC | Age: 58
End: 2018-05-08
Payer: COMMERCIAL

## 2018-05-08 VITALS
HEIGHT: 62 IN | BODY MASS INDEX: 35.04 KG/M2 | HEART RATE: 70 BPM | TEMPERATURE: 98.8 F | WEIGHT: 190.4 LBS | SYSTOLIC BLOOD PRESSURE: 116 MMHG | DIASTOLIC BLOOD PRESSURE: 68 MMHG

## 2018-05-08 DIAGNOSIS — Z12.11 SCREEN FOR COLON CANCER: ICD-10-CM

## 2018-05-08 DIAGNOSIS — J30.0 CHRONIC VASOMOTOR RHINITIS: ICD-10-CM

## 2018-05-08 DIAGNOSIS — Z12.31 ENCOUNTER FOR SCREENING MAMMOGRAM FOR BREAST CANCER: ICD-10-CM

## 2018-05-08 DIAGNOSIS — F51.04 CHRONIC INSOMNIA: ICD-10-CM

## 2018-05-08 DIAGNOSIS — G43.109 MIGRAINE WITH AURA AND WITHOUT STATUS MIGRAINOSUS, NOT INTRACTABLE: ICD-10-CM

## 2018-05-08 DIAGNOSIS — F32.1 MAJOR DEPRESSIVE DISORDER, SINGLE EPISODE, MODERATE (H): Primary | ICD-10-CM

## 2018-05-08 PROCEDURE — 99214 OFFICE O/P EST MOD 30 MIN: CPT | Performed by: FAMILY MEDICINE

## 2018-05-08 RX ORDER — ZOLPIDEM TARTRATE 5 MG/1
TABLET ORAL
Qty: 90 TABLET | Refills: 1 | Status: SHIPPED | OUTPATIENT
Start: 2018-05-08 | End: 2018-11-06

## 2018-05-08 RX ORDER — FLUTICASONE PROPIONATE 50 MCG
2 SPRAY, SUSPENSION (ML) NASAL DAILY
Qty: 48 ML | Refills: 11 | Status: SHIPPED | OUTPATIENT
Start: 2018-05-08 | End: 2018-11-06

## 2018-05-08 RX ORDER — SERTRALINE HYDROCHLORIDE 100 MG/1
TABLET, FILM COATED ORAL
Qty: 135 TABLET | Refills: 1 | Status: SHIPPED | OUTPATIENT
Start: 2018-05-08 | End: 2018-10-31

## 2018-05-08 RX ORDER — SUMATRIPTAN 50 MG/1
50 TABLET, FILM COATED ORAL
Qty: 9 TABLET | Refills: 3 | Status: SHIPPED | OUTPATIENT
Start: 2018-05-08 | End: 2018-11-06

## 2018-05-08 ASSESSMENT — ANXIETY QUESTIONNAIRES
2. NOT BEING ABLE TO STOP OR CONTROL WORRYING: NOT AT ALL
GAD7 TOTAL SCORE: 2
IF YOU CHECKED OFF ANY PROBLEMS ON THIS QUESTIONNAIRE, HOW DIFFICULT HAVE THESE PROBLEMS MADE IT FOR YOU TO DO YOUR WORK, TAKE CARE OF THINGS AT HOME, OR GET ALONG WITH OTHER PEOPLE: NOT DIFFICULT AT ALL
1. FEELING NERVOUS, ANXIOUS, OR ON EDGE: SEVERAL DAYS
7. FEELING AFRAID AS IF SOMETHING AWFUL MIGHT HAPPEN: NOT AT ALL
6. BECOMING EASILY ANNOYED OR IRRITABLE: NOT AT ALL
5. BEING SO RESTLESS THAT IT IS HARD TO SIT STILL: NOT AT ALL
3. WORRYING TOO MUCH ABOUT DIFFERENT THINGS: SEVERAL DAYS

## 2018-05-08 ASSESSMENT — PATIENT HEALTH QUESTIONNAIRE - PHQ9: 5. POOR APPETITE OR OVEREATING: NOT AT ALL

## 2018-05-08 NOTE — MR AVS SNAPSHOT
After Visit Summary   5/8/2018    Allyn Thurman    MRN: [id number]           Patient Information     Date Of Birth          1960        Visit Information        Provider Department      5/8/2018 5:40 PM Aurelio Rivera MD [facility]        Today's Diagnoses     Moderate Depression [296.22]    -  1    Chronic insomnia        Chronic vasomotor rhinitis        Migraine with aura and without status migrainosus, not intractable        Screen for colon cancer        Encounter for screening mammogram for breast cancer           Follow-ups after your visit        Additional Services     GASTROENTEROLOGY ADULT REF PROCEDURE ONLY [facility] [phone]; No Provider Preference       Last Lab Result: Creatinine (mg/dL)       Date                     Value                 01/20/2016               0.73             ----------  There is no height or weight on file to calculate BMI.     Needed:  No  Language:  English    Patient will be contacted to schedule procedure.     Please be aware that coverage of these services is subject to the terms and limitations of your health insurance plan.  Call member services at your health plan with any benefit or coverage questions.  Any procedures must be performed at a [facility] facility OR coordinated by your clinic's referral office.    Please bring the following with you to your appointment:    (1) Any X-Rays, CTs or MRIs which have been performed.  Contact the facility where they were done to arrange for  prior to your scheduled appointment.    (2) List of current medications   (3) This referral request   (4) Any documents/labs given to you for this referral                  Follow-up notes from your care team     Return in about 6 months (around 11/8/2018) for Annual Physical Exam.      Your next 10 appointments already scheduled     Jun 05, 2018  5:15 PM CDT   MA SCREENING DIGITAL BILATERAL with ECMA1   [facility] [place] ([facility])    [address] [place], [place] [address] [address]   [phone]           Do not use any powder, lotion or deodorant under your arms or on your breast. If you do, we will ask you to remove it before your exam.  Wear comfortable, two-piece clothing.  If you have any allergies, tell your care team.  Bring any previous mammograms from other facilities or have them mailed to the breast center.            Nov 06, 2018  8:40 AM CST   PHYSICAL with [facility]   [facility]    [address] [place] [id number] [phone]              Future tests that were ordered for you today     Open Future Orders        Priority Expected Expires Ordered    *MA Screening Digital Bilateral Routine  5/8/2019 5/8/2018            Who to contact     If you have questions or need follow up information about today's clinic visit or your schedule please contact [facility] directly at [phone].  Normal or non-critical lab and imaging results will be communicated to you by MyChart, letter or phone within 4 business days after the clinic has received the results. If you do not hear from us within 7 days, please contact the clinic through [facility] or phone. If you have a critical or abnormal lab result, we will notify you by phone as soon as possible.  Submit refill requests through [facility] or call your pharmacy and they will forward the refill request to us. Please allow 3 business days for your refill to be completed.          Additional Information About Your Visit        [facility] Information     D[facility]ie lets you send messages to your doctor, view your test results, renew your prescriptions, schedule appointments and more. To sign up, go to [email or web address] . Click on \"Log in\" on the left side of the screen, which will take you to the Welcome page. Then click on \"Sign up Now\" on " "the right side of the page.     You will be asked to enter the access code listed below, as well as some personal information. Please follow the directions to create your username and password.     Your access code is: [email or web address]  Expires: 2018  6:18 PM     Your access code will  in 90 days. If you need help or a new code, please call your [place] clinic or [phone].        Care EveryWhere ID     This is your Care EveryWhere ID. This could be used by other organizations to access your [place] medical records  [id number]        Your Vitals Were     Pulse Temperature Height Last Period BMI (Body Mass Index)       70 98.8  F (37.1  C) (Oral) 5' 2\" (1.575 m) 2014 34.82 kg/m2        Blood Pressure from Last 3 Encounters:   18 116/68   17 110/64   17 126/84    Weight from Last 3 Encounters:   18 190 lb 6.4 oz (86.4 kg)   17 185 lb (83.9 kg)   17 189 lb (85.7 kg)              We Performed the Following     DEPRESSION ACTION PLAN (DAP)     GASTROENTEROLOGY ADULT REF PROCEDURE ONLY [facility] Tracy [phone]; No Provider Preference          Today's Medication Changes          These changes are accurate as of 18  6:18 PM.  If you have any questions, ask your nurse or doctor.               These medicines have changed or have updated prescriptions.        Dose/Directions    fluticasone 50 MCG/ACT spray   Commonly known as:  FLONASE   This may have changed:  See the new instructions.   Used for:  Chronic vasomotor rhinitis   Changed by:  Aurelio Rivera MD        Dose:  2 spray   Spray 2 sprays into both nostrils daily   Quantity:  48 mL   Refills:  11       sertraline 100 MG tablet   Commonly known as:  ZOLOFT   This may have changed:  See the new instructions.   Used for:  Major depressive disorder, single episode, moderate (H)   Changed by:  Aurelio Rivera MD        TAKE 1.5 TABLETS (150 MG) BY MOUTH DAILY   Quantity:  135 tablet   Refills:  1       " zolpidem 5 MG tablet   Commonly known as:  AMBIEN   This may have changed:  See the new instructions.   Used for:  Chronic insomnia   Changed by:  Aurelio Rivera MD        TAKE 1 TABLET BY MOUTH ONCE NIGHTLY AS NEEDED FOR SLEEP.   Quantity:  90 tablet   Refills:  1            Where to get your medicines      These medications were sent to [facility]/pharmacy #[id number] - [place], [place] - [address]  [address], [place] [address]     Phone:  [phone]     fluticasone 50 MCG/ACT spray    sertraline 100 MG tablet    SUMAtriptan 50 MG tablet         Some of these will need a paper prescription and others can be bought over the counter.  Ask your nurse if you have questions.     Bring a paper prescription for each of these medications     zolpidem 5 MG tablet                Primary Care Provider Office Phone # Fax #    Aurelio Rivera [phone]       [address]  [place] [address]        Equal Access to Services     [facility]: Hadii aad ku hadasho Soomaali, waaxda luqadaha, qaybta kaalmada adeegyada, waxay idiin hayaan stephanie carrasquillo . So [facility] [phone].    ATENCIÓN: Si habla español, tiene a moraes disposición servicios gratuitos de asistencia lingüística. [facility] [phone].    We comply with applicable federal civil rights laws and [place] laws. We do not discriminate on the basis of race, color, national origin, age, disability, sex, sexual orientation, or gender identity.            Thank you!     Thank you for choosing [facility] [place]  for your care. Our goal is always to provide you with excellent care. Hearing back from our patients is one way we can continue to improve our services. Please take a few minutes to complete the written survey that you may receive in the mail after your visit with us. Thank you!             Your Updated Medication List - Protect others around you: Learn how to safely use, store and throw away your medicines at  www.disposemymeds.org.          This list is accurate as of 5/8/18  6:18 PM.  Always use your most recent med list.                   Brand Name Dispense Instructions for use Diagnosis    B Complex Tabs      Take 1 tablet by mouth daily.        CENTRUM Tabs      Take 1 tablet by mouth daily.        cetirizine 10 MG tablet    zyrTEC    30 tablet    Take 1 tablet (10 mg) by mouth every morning    Lichen planus       ferrous sulfate Dried 160 (50 Fe) MG tablet     30 tablet    Take 1 tablet by mouth daily (with breakfast).        fluticasone 50 MCG/ACT spray    FLONASE    48 mL    Spray 2 sprays into both nostrils daily    Chronic vasomotor rhinitis       METAMUCIL SMOOTH TEXTURE 63 % Powd   Generic drug:  psyllium     1 Bottle    Take 3 teaspoonful by mouth daily. Mix in 8 ounces of water        sertraline 100 MG tablet    ZOLOFT    135 tablet    TAKE 1.5 TABLETS (150 MG) BY MOUTH DAILY    Major depressive disorder, single episode, moderate (H)       SUMAtriptan 50 MG tablet    IMITREX    9 tablet    Take 1 tablet (50 mg) by mouth at onset of headache for migraine May repeat dose in 2 hours if needed, max 4 tabs per 24 hours    Migraine with aura and without status migrainosus, not intractable       vitamin D 2000 units tablet      Take 1 tablet by mouth daily.        zolpidem 5 MG tablet    AMBIEN    90 tablet    TAKE 1 TABLET BY MOUTH ONCE NIGHTLY AS NEEDED FOR SLEEP.    Chronic insomnia

## 2018-05-08 NOTE — PROGRESS NOTES
SUBJECTIVE:                                                    Allyn Thurman is a 57 year old female who presents to clinic today for the following health issues:      Depression Followup    Status since last visit: same or maybe slightly worse    See PHQ-9 for current symptoms.  Other associated symptoms: None    Complicating factors:   Significant life event:  No   Current substance abuse:  None  Anxiety or Panic symptoms:  No  Currently on sertraline 150 mg daily.  Does not want to change the dose at this time.  PHQ-9 2/14/2017 3/14/2017 10/30/2017   Total Score 7 3 4   Q9: Suicide Ideation Not at all Not at all Not at all     PHQ 9 score is 7 today.  Tells that seeing her parents health decline has been very hard for her.  PHQ-9  English  PHQ-9   Any Language  Suicide Assessment Five-step Evaluation and Treatment (SAFE-T)    Amount of exercise or physical activity: intermittent walking     Problems taking medications regularly: No    Medication side effects: none    Diet: regular (no restrictions)    History of migraine headaches.  Symptoms are well managed.  Rarely use Imitrex.  Needs medication refills.  Continues to use Ambien for insomnia.  If she does not use the medication, she does not sleep.  Needs refill on Flonase.  Allergy symptoms are getting worse.    Problem list and histories reviewed & adjusted, as indicated.  Additional history: as documented    Patient Active Problem List   Diagnosis     Vitamin D deficiency     HYPERLIPIDEMIA LDL GOAL <160     Moderate Depression [296.22]     Migraine     Chronic insomnia     Chronic rhinitis     Obesity     Normocytic anemia     Ankle sprain     Contusion     Migraine with aura and without status migrainosus, not intractable     Advanced directives, counseling/discussion     Past Surgical History:   Procedure Laterality Date     C ECHO HEART XTHORACIC,COMPLETE, W/O DOPPLER  1/2008    normal     C TMJ RECONSTRUCTION  1985    right     COLONOSCOPY  5/2008     normal, repeat 10 years     HC SLEEP STUDY; ATTENDED  6/2009    AHI 0.7, RDI 4.4, max desat FIO2 90%, poor sleep architecture first 4 hours      HC STAB INCISIONS PHLEBECT VARI VEINS 1 EXT, 10-20 INC  1990    bilateral     LASIK  2005     TONSILLECTOMY  1967       Social History   Substance Use Topics     Smoking status: Former Smoker     Packs/day: 2.00     Years: 10.00     Types: Cigarettes     Quit date: 1/1/1989     Smokeless tobacco: Never Used     Alcohol use 0.0 oz/week     0 Standard drinks or equivalent per week      Comment: one drink per month     Family History   Problem Relation Age of Onset     Alcohol/Drug Mother      Depression Mother      attempted suicide age 76     Thyroid Disease Mother      hypothyroidism     Hypertension Mother      DIABETES Father      borderline diabetic     Eye Disorder Father      glaucoma, herpetic keratitis     Hypertension Father      DIABETES Paternal Uncle      type 2     Depression Brother      comitted suicide age 44     Respiratory Brother      sleep apnea     Respiratory Brother      asthma         Current Outpatient Prescriptions   Medication Sig Dispense Refill     B Complex TABS Take 1 tablet by mouth daily.       cetirizine (ZYRTEC) 10 MG tablet Take 1 tablet (10 mg) by mouth every morning 30 tablet 1     Cholecalciferol (VITAMIN D) 2000 UNIT tablet Take 1 tablet by mouth daily.       Ferrous Sulfate Dried 160 (50 FE) MG tablet Take 1 tablet by mouth daily (with breakfast). 30 tablet prn     fluticasone (FLONASE) 50 MCG/ACT spray Spray 2 sprays into both nostrils daily 48 mL 11     Multiple Vitamins-Minerals (CENTRUM) TABS Take 1 tablet by mouth daily.       psyllium (METAMUCIL SMOOTH TEXTURE) 63 % POWD Take 3 teaspoonful by mouth daily. Mix in 8 ounces of water 1 Bottle 11     sertraline (ZOLOFT) 100 MG tablet TAKE 1.5 TABLETS (150 MG) BY MOUTH DAILY 135 tablet 1     SUMAtriptan (IMITREX) 50 MG tablet Take 1 tablet (50 mg) by mouth at onset of headache for  "migraine May repeat dose in 2 hours if needed, max 4 tabs per 24 hours 9 tablet 3     zolpidem (AMBIEN) 5 MG tablet TAKE 1 TABLET BY MOUTH ONCE NIGHTLY AS NEEDED FOR SLEEP. 90 tablet 1     [DISCONTINUED] sertraline (ZOLOFT) 100 MG tablet TAKE 1.5 TABLETS (150 MG) BY MOUTH DAILY 45 tablet 0     No Known Allergies    ROS:  CONSTITUTIONAL: NEGATIVE for fever, chills, change in weight  ENT/MOUTH: NEGATIVE for ear, mouth and throat problems  RESP: NEGATIVE for significant cough or SOB  CV: NEGATIVE for chest pain, palpitations or peripheral edema    OBJECTIVE:                                                    /68 (BP Location: Left arm, Patient Position: Chair, Cuff Size: Adult Regular)  Pulse 70  Temp 98.8  F (37.1  C) (Oral)  Ht 5' 2\" (1.575 m)  Wt 190 lb 6.4 oz (86.4 kg)  LMP 01/03/2014  BMI 34.82 kg/m2  Body mass index is 34.82 kg/(m^2).   GENERAL: healthy, alert, well nourished, well hydrated, no distress  HENT: ear canals- normal; TMs- normal; Nose- normal; Mouth- no ulcers, no lesions  NECK: no tenderness, no adenopathy, no asymmetry, no masses, no stiffness; thyroid- normal to palpation  RESP: lungs clear to auscultation - no rales, no rhonchi, no wheezes  CV: regular rates and rhythm, normal S1 S2, no S3 or S4 and no murmur, no click or rub -  ABDOMEN: soft, no tenderness, no  hepatosplenomegaly, no masses, normal bowel sounds  PSYCH: Alert and oriented times 3; speech- coherent , normal rate and volume; able to articulate logical thoughts, able to abstract reason, no tangential thoughts, no hallucinations or delusions, affect- normal         ASSESSMENT/PLAN:                                                      1. Moderate Depression [296.22]  Symptoms are not well controlled.  I discussed about adding Wellbutrin to Zoloft.  Patient would like to hold off on that.  Instructed to notify me back if any worsening of symptoms noted.  For now refill ordered.  Follow-up as needed and in 6 months for " recheck  - sertraline (ZOLOFT) 100 MG tablet; TAKE 1.5 TABLETS (150 MG) BY MOUTH DAILY  Dispense: 135 tablet; Refill: 1    2. Chronic insomnia  Resume Ambien.  Refill ordered.  Patient has been using it every night.  - zolpidem (AMBIEN) 5 MG tablet; TAKE 1 TABLET BY MOUTH ONCE NIGHTLY AS NEEDED FOR SLEEP.  Dispense: 90 tablet; Refill: 1    3. Chronic vasomotor rhinitis  Refill ordered.  Recommended to use 1-2 sprays on each side as needed.  - fluticasone (FLONASE) 50 MCG/ACT spray; Spray 2 sprays into both nostrils daily  Dispense: 48 mL; Refill: 11    4. Migraine with aura and without status migrainosus, not intractable  Well-controlled.  Refill on Imitrex ordered  - SUMAtriptan (IMITREX) 50 MG tablet; Take 1 tablet (50 mg) by mouth at onset of headache for migraine May repeat dose in 2 hours if needed, max 4 tabs per 24 hours  Dispense: 9 tablet; Refill: 3    5. Screen for colon cancer  She is due for screening colonoscopy.  - GASTROENTEROLOGY ADULT REF PROCEDURE ONLY [facility] [phone]; No Provider Preference    6. Encounter for screening mammogram for breast cancer  Patient is due for a screening mammogram.  - *MA Screening Digital Bilateral; Future      Follow up with Provider - 6 months     Aurelio Rivera MD  [facility] [place]

## 2018-05-08 NOTE — NURSING NOTE
"Chief Complaint   Patient presents with     Recheck Medication     Initial /68 (BP Location: Left arm, Patient Position: Chair, Cuff Size: Adult Regular)  Pulse 70  Temp 98.8  F (37.1  C) (Oral)  Ht 5' 2\" (1.575 m)  Wt 190 lb 6.4 oz (86.4 kg)  LMP 01/03/2014  BMI 34.82 kg/m2 Estimated body mass index is 34.82 kg/(m^2) as calculated from the following:    Height as of this encounter: 5' 2\" (1.575 m).    Weight as of this encounter: 190 lb 6.4 oz (86.4 kg).  BP completed using cuff size regular left Arm  Nessa Haydee CMA    "

## 2018-05-08 NOTE — LETTER
My Depression Action Plan  Name: Allyn Thurman   Date of Birth 1960  Date: 5/8/2018    My doctor: Aurelio Rivera   My clinic: [address] [address]  [phone]          GREEN    ZONE   Good Control    What it looks like:     Things are going generally well. You have normal up s and down s. You may even feel depressed from time to time, but bad moods usually last less than a day.   What you need to do:  1. Continue to care for yourself (see self care plan)  2. Check your depression survival kit and update it as needed  3. Follow your physician s recommendations including any medication.  4. Do not stop taking medication unless you consult with your physician first.           YELLOW         ZONE Getting Worse    What it looks like:     Depression is starting to interfere with your life.     It may be hard to get out of bed; you may be starting to isolate yourself from others.    Symptoms of depression are starting to last most all day and this has happened for several days.     You may have suicidal thoughts but they are not constant.   What you need to do:     1. Call your care team, your response to treatment will improve if you keep your care team informed of your progress. Yellow periods are signs an adjustment may need to be made.     2. Continue your self-care, even if you have to fake it!    3. Talk to someone in your support network    4. Open up your depression survival kit           RED    ZONE Medical Alert - Get Help    What it looks like:     Depression is seriously interfering with your life.     You may experience these or other symptoms: You can t get out of bed most days, can t work or engage in other necessary activities, you have trouble taking care of basic hygiene, or basic responsibilities, thoughts of suicide or death that will not go away, self-injurious behavior.     What you need to do:  1. Call your care team  and request a same-day appointment. If they are not available (weekends or after hours) call your local crisis line, emergency room or 911.            Depression Self Care Plan / Survival Kit    Self-Care for Depression  Here s the deal. Your body and mind are really not as separate as most people think.  What you do and think affects how you feel and how you feel influences what you do and think. This means if you do things that people who feel good do, it will help you feel better.  Sometimes this is all it takes.  There is also a place for medication and therapy depending on how severe your depression is, so be sure to consult with your medical provider and/ or Behavioral Health Consultant if your symptoms are worsening or not improving.     In order to better manage my stress, I will:    Exercise  Get some form of exercise, every day. This will help reduce pain and release endorphins, the  feel good  chemicals in your brain. This is almost as good as taking antidepressants!  This is not the same as joining a gym and then never going! (they count on that by the way ) It can be as simple as just going for a walk or doing some gardening, anything that will get you moving.      Hygiene   Maintain good hygiene (Get out of bed in the morning, Make your bed, Brush your teeth, Take a shower, and Get dressed like you were going to work, even if you are unemployed).  If your clothes don't fit try to get ones that do.    Diet  I will strive to eat foods that are good for me, drink plenty of water, and avoid excessive sugar, caffeine, alcohol, and other mood-altering substances.  Some foods that are helpful in depression are: complex carbohydrates, B vitamins, flaxseed, fish or fish oil, fresh fruits and vegetables.    Psychotherapy  I agree to participate in Individual Therapy (if recommended).    Medication  If prescribed medications, I agree to take them.  Missing doses can result in serious side effects.  I understand  that drinking alcohol, or other illicit drug use, may cause potential side effects.  I will not stop my medication abruptly without first discussing it with my provider.    Staying Connected With Others  I will stay in touch with my friends, family members, and my primary care provider/team.    Use your imagination  Be creative.  We all have a creative side; it doesn t matter if it s oil painting, sand castles, or mud pies! This will also kick up the endorphins.    Witness Beauty  (AKA stop and smell the roses) Take a look outside, even in mid-winter. Notice colors, textures. Watch the squirrels and birds.     Service to others  Be of service to others.  There is always someone else in need.  By helping others we can  get out of ourselves  and remember the really important things.  This also provides opportunities for practicing all the other parts of the program.    Humor  Laugh and be silly!  Adjust your TV habits for less news and crime-drama and more comedy.    Control your stress  Try breathing deep, massage therapy, biofeedback, and meditation. Find time to relax each day.     My support system    Clinic Contact:  Phone number:    Contact 1:  Phone number:    Contact 2:  Phone number:    Muslim/:  Phone number:    Therapist:  Phone number:    Local crisis center:    Phone number:    Other community support:  Phone number:

## 2018-05-09 ASSESSMENT — ANXIETY QUESTIONNAIRES: GAD7 TOTAL SCORE: 2

## 2018-05-09 ASSESSMENT — PATIENT HEALTH QUESTIONNAIRE - PHQ9: SUM OF ALL RESPONSES TO PHQ QUESTIONS 1-9: 7

## 2018-05-18 DIAGNOSIS — F32.1 MAJOR DEPRESSIVE DISORDER, SINGLE EPISODE, MODERATE (H): ICD-10-CM

## 2018-05-18 NOTE — TELEPHONE ENCOUNTER
"Requested Prescriptions   Pending Prescriptions Disp Refills     sertraline (ZOLOFT) 100 MG tablet [Pharmacy Med Name: SERTRALINE  MG TABLET]  Last Written Prescription Date:  5/8/18  Last Fill Quantity: 135,  # refills: 1   Last office visit: 5/8/2018 with prescribing provider:  Miguel   Future Office Visit:     45 tablet 0     Sig: TAKE 1.5 TABLETS BY MOUTH DAILY    SSRIs Protocol Failed    5/18/2018  1:27 AM       Failed - PHQ-9 score less than 5 in past 6 months    Please review last PHQ-9 score.   PHQ-9 SCORE 3/14/2017 10/30/2017 5/8/2018   Total Score - - -   Total Score 3 4 7              Passed - Patient is age 18 or older       Passed - No active pregnancy on record       Passed - No positive pregnancy test in last 12 months       Passed - Recent (6 mo) or future (30 days) visit within the authorizing provider's specialty    Patient had office visit in the last 6 months or has a visit in the next 30 days with authorizing provider or within the authorizing provider's specialty.  See \"Patient Info\" tab in inbasket, or \"Choose Columns\" in Meds & Orders section of the refill encounter.              "

## 2018-05-22 RX ORDER — SERTRALINE HYDROCHLORIDE 100 MG/1
TABLET, FILM COATED ORAL
Qty: 45 TABLET | Refills: 0 | OUTPATIENT
Start: 2018-05-22

## 2018-06-30 DIAGNOSIS — J31.0 CHRONIC RHINITIS: ICD-10-CM

## 2018-06-30 NOTE — TELEPHONE ENCOUNTER
"Requested Prescriptions   Pending Prescriptions Disp Refills     fluticasone (FLONASE) 50 MCG/ACT spray [Pharmacy Med Name: FLUTICASONE PROP 50 MCG SPRAY]  Last Written Prescription Date:  5/8/18  Last Fill Quantity: 48mL,  # refills: 11   Last office visit: 5/8/2018 with prescribing provider:  gayathri    Future Office Visit:     48 mL 1     Sig: USE 2 SPRAYS IN EACH NOSTRIL ONCE DAILY    Inhaled Steroids Protocol Failed    6/30/2018 12:11 PM       Failed - Recent (12 mo) or future (30 days) visit within the authorizing provider's specialty    Patient had office visit in the last 12 months or has a visit in the next 30 days with authorizing provider or within the authorizing provider's specialty.  See \"Patient Info\" tab in inbasket, or \"Choose Columns\" in Meds & Orders section of the refill encounter.           Passed - Patient is age 12 or older          "

## 2018-07-02 RX ORDER — FLUTICASONE PROPIONATE 50 MCG
SPRAY, SUSPENSION (ML) NASAL
Qty: 48 ML | Refills: 1 | OUTPATIENT
Start: 2018-07-02

## 2018-07-02 NOTE — TELEPHONE ENCOUNTER
Patient has refills remaining with pharmacy.  Marika Dillard RN - Triage  [facility]

## 2018-11-06 ENCOUNTER — OFFICE VISIT (OUTPATIENT)
Dept: FAMILY MEDICINE | Facility: CLINIC | Age: 58
End: 2018-11-06
Payer: COMMERCIAL

## 2018-11-06 VITALS
SYSTOLIC BLOOD PRESSURE: 112 MMHG | HEIGHT: 62 IN | OXYGEN SATURATION: 96 % | DIASTOLIC BLOOD PRESSURE: 72 MMHG | WEIGHT: 194 LBS | TEMPERATURE: 97.9 F | HEART RATE: 66 BPM | BODY MASS INDEX: 35.7 KG/M2

## 2018-11-06 DIAGNOSIS — Z00.00 ANNUAL PHYSICAL EXAM: Primary | ICD-10-CM

## 2018-11-06 DIAGNOSIS — Z12.11 SPECIAL SCREENING FOR MALIGNANT NEOPLASMS, COLON: ICD-10-CM

## 2018-11-06 DIAGNOSIS — Z23 NEED FOR PROPHYLACTIC VACCINATION AND INOCULATION AGAINST INFLUENZA: ICD-10-CM

## 2018-11-06 DIAGNOSIS — E55.9 VITAMIN D DEFICIENCY: ICD-10-CM

## 2018-11-06 DIAGNOSIS — G43.109 MIGRAINE WITH AURA AND WITHOUT STATUS MIGRAINOSUS, NOT INTRACTABLE: ICD-10-CM

## 2018-11-06 DIAGNOSIS — Z12.31 ENCOUNTER FOR SCREENING MAMMOGRAM FOR BREAST CANCER: ICD-10-CM

## 2018-11-06 DIAGNOSIS — F51.04 CHRONIC INSOMNIA: ICD-10-CM

## 2018-11-06 DIAGNOSIS — J30.0 CHRONIC VASOMOTOR RHINITIS: ICD-10-CM

## 2018-11-06 PROBLEM — E66.01 MORBID OBESITY (H): Status: ACTIVE | Noted: 2018-11-06

## 2018-11-06 LAB
ALBUMIN SERPL-MCNC: 3.9 G/DL (ref 3.4–5)
ALP SERPL-CCNC: 67 U/L (ref 40–150)
ALT SERPL W P-5'-P-CCNC: 24 U/L (ref 0–50)
ANION GAP SERPL CALCULATED.3IONS-SCNC: 11 MMOL/L (ref 3–14)
AST SERPL W P-5'-P-CCNC: 15 U/L (ref 0–45)
BILIRUB SERPL-MCNC: 0.4 MG/DL (ref 0.2–1.3)
BUN SERPL-MCNC: 17 MG/DL (ref 7–30)
CALCIUM SERPL-MCNC: 9.8 MG/DL (ref 8.5–10.1)
CHLORIDE SERPL-SCNC: 104 MMOL/L (ref 94–109)
CHOLEST SERPL-MCNC: 231 MG/DL
CO2 SERPL-SCNC: 24 MMOL/L (ref 20–32)
CREAT SERPL-MCNC: 0.72 MG/DL (ref 0.52–1.04)
ERYTHROCYTE [DISTWIDTH] IN BLOOD BY AUTOMATED COUNT: 13.3 % (ref 10–15)
GFR SERPL CREATININE-BSD FRML MDRD: 83 ML/MIN/1.7M2
GLUCOSE SERPL-MCNC: 88 MG/DL (ref 70–99)
HCT VFR BLD AUTO: 37.5 % (ref 35–47)
HDLC SERPL-MCNC: 77 MG/DL
HGB BLD-MCNC: 12.2 G/DL (ref 11.7–15.7)
LDLC SERPL CALC-MCNC: 139 MG/DL
MCH RBC QN AUTO: 28.1 PG (ref 26.5–33)
MCHC RBC AUTO-ENTMCNC: 32.5 G/DL (ref 31.5–36.5)
MCV RBC AUTO: 86 FL (ref 78–100)
NONHDLC SERPL-MCNC: 154 MG/DL
PLATELET # BLD AUTO: 217 10E9/L (ref 150–450)
POTASSIUM SERPL-SCNC: 4.1 MMOL/L (ref 3.4–5.3)
PROT SERPL-MCNC: 7.7 G/DL (ref 6.8–8.8)
RBC # BLD AUTO: 4.34 10E12/L (ref 3.8–5.2)
SODIUM SERPL-SCNC: 139 MMOL/L (ref 133–144)
TRIGL SERPL-MCNC: 75 MG/DL
TSH SERPL DL<=0.005 MIU/L-ACNC: 2.52 MU/L (ref 0.4–4)
WBC # BLD AUTO: 6.5 10E9/L (ref 4–11)

## 2018-11-06 PROCEDURE — 82306 VITAMIN D 25 HYDROXY: CPT | Performed by: FAMILY MEDICINE

## 2018-11-06 PROCEDURE — 90471 IMMUNIZATION ADMIN: CPT | Performed by: FAMILY MEDICINE

## 2018-11-06 PROCEDURE — 84443 ASSAY THYROID STIM HORMONE: CPT | Performed by: FAMILY MEDICINE

## 2018-11-06 PROCEDURE — 80061 LIPID PANEL: CPT | Performed by: FAMILY MEDICINE

## 2018-11-06 PROCEDURE — 99213 OFFICE O/P EST LOW 20 MIN: CPT | Mod: 25 | Performed by: FAMILY MEDICINE

## 2018-11-06 PROCEDURE — 90682 RIV4 VACC RECOMBINANT DNA IM: CPT | Performed by: FAMILY MEDICINE

## 2018-11-06 PROCEDURE — 87389 HIV-1 AG W/HIV-1&-2 AB AG IA: CPT | Performed by: FAMILY MEDICINE

## 2018-11-06 PROCEDURE — 80053 COMPREHEN METABOLIC PANEL: CPT | Performed by: FAMILY MEDICINE

## 2018-11-06 PROCEDURE — 99396 PREV VISIT EST AGE 40-64: CPT | Mod: 25 | Performed by: FAMILY MEDICINE

## 2018-11-06 PROCEDURE — 36415 COLL VENOUS BLD VENIPUNCTURE: CPT | Performed by: FAMILY MEDICINE

## 2018-11-06 PROCEDURE — 87624 HPV HI-RISK TYP POOLED RSLT: CPT | Performed by: FAMILY MEDICINE

## 2018-11-06 PROCEDURE — 85027 COMPLETE CBC AUTOMATED: CPT | Performed by: FAMILY MEDICINE

## 2018-11-06 PROCEDURE — G0145 SCR C/V CYTO,THINLAYER,RESCR: HCPCS | Performed by: FAMILY MEDICINE

## 2018-11-06 RX ORDER — SUMATRIPTAN 50 MG/1
50 TABLET, FILM COATED ORAL
Qty: 9 TABLET | Refills: 3 | Status: SHIPPED | OUTPATIENT
Start: 2018-11-06 | End: 2020-02-19

## 2018-11-06 RX ORDER — FLUTICASONE PROPIONATE 50 MCG
2 SPRAY, SUSPENSION (ML) NASAL DAILY
Qty: 48 ML | Refills: 11 | Status: SHIPPED | OUTPATIENT
Start: 2018-11-06 | End: 2019-07-16

## 2018-11-06 RX ORDER — ZOLPIDEM TARTRATE 5 MG/1
TABLET ORAL
Qty: 90 TABLET | Refills: 1 | Status: SHIPPED | OUTPATIENT
Start: 2018-11-06 | End: 2019-05-28

## 2018-11-06 ASSESSMENT — PATIENT HEALTH QUESTIONNAIRE - PHQ9
5. POOR APPETITE OR OVEREATING: NOT AT ALL
SUM OF ALL RESPONSES TO PHQ QUESTIONS 1-9: 6

## 2018-11-06 ASSESSMENT — ANXIETY QUESTIONNAIRES
6. BECOMING EASILY ANNOYED OR IRRITABLE: SEVERAL DAYS
5. BEING SO RESTLESS THAT IT IS HARD TO SIT STILL: SEVERAL DAYS
GAD7 TOTAL SCORE: 4
7. FEELING AFRAID AS IF SOMETHING AWFUL MIGHT HAPPEN: NOT AT ALL
2. NOT BEING ABLE TO STOP OR CONTROL WORRYING: SEVERAL DAYS
1. FEELING NERVOUS, ANXIOUS, OR ON EDGE: SEVERAL DAYS
IF YOU CHECKED OFF ANY PROBLEMS ON THIS QUESTIONNAIRE, HOW DIFFICULT HAVE THESE PROBLEMS MADE IT FOR YOU TO DO YOUR WORK, TAKE CARE OF THINGS AT HOME, OR GET ALONG WITH OTHER PEOPLE: NOT DIFFICULT AT ALL
3. WORRYING TOO MUCH ABOUT DIFFERENT THINGS: NOT AT ALL

## 2018-11-06 NOTE — LETTER
November 7, 2018      Allyn BLACKBURN Olman  [address] [place] [address]        Dear ,    I have reviewed your recent labs. Here are the results:    -Liver and gallbladder tests are normal (ALT,AST, Alk phos, bilirubin), kidney function is normal (Cr, GFR), sodium is normal, potassium is normal, calcium is normal, glucose is normal.    Your LDL cholesterol is flagged as high. However, this is a cut off only for people with chronic health conditions like diabetes, heart disease. For other individuals the goals for LDL cholesterol are higher, so I am happy with where your LDL is.    ADVICE: The mediterranean diet has been shown to reduce LDL cholesterol and increase HDL cholesterol and has also been shown to reduce the risk of cardiovascular disease, cancer, and Alzheimer's dementia. This diet focuses on healthy fats (monounsatruated and polyunsaturated) such that are found in fish, extra virgin olive oil, nuts, and avocados. It is also high in fruits & vegetables (7 servings per day) and whole grains. Regular exercise is also important in reducing cholesterol. Repeat cholesterol panel should be checked in 1 year again.      -TSH (thyroid stimulating hormone) level is normal which indicates normal thyroid function.  -Normal red blood cell (hgb) levels, normal white blood cell count and normal platelet levels.  -HIV test is normal.  -Vitamin D level is normal and getting 1000 IU daily in your diet or supplements is recommended.     Results for orders placed or performed in visit on 11/06/18   Lipid Profile   Result Value Ref Range    Cholesterol 231 (H) <200 mg/dL    Triglycerides 75 <150 mg/dL    HDL Cholesterol 77 >49 mg/dL    LDL Cholesterol Calculated 139 (H) <100 mg/dL    Non HDL Cholesterol 154 (H) <130 mg/dL   TSH with free T4 reflex   Result Value Ref Range    TSH 2.52 0.40 - 4.00 mU/L   Comprehensive metabolic panel   Result Value Ref Range    Sodium 139 133 - 144 mmol/L    Potassium  4.1 3.4 - 5.3 mmol/L    Chloride 104 94 - 109 mmol/L    Carbon Dioxide 24 20 - 32 mmol/L    Anion Gap 11 3 - 14 mmol/L    Glucose 88 70 - 99 mg/dL    Urea Nitrogen 17 7 - 30 mg/dL    Creatinine 0.72 0.52 - 1.04 mg/dL    GFR Estimate 83 >60 mL/min/1.7m2    GFR Estimate If Black >90 >60 mL/min/1.7m2    Calcium 9.8 8.5 - 10.1 mg/dL    Bilirubin Total 0.4 0.2 - 1.3 mg/dL    Albumin 3.9 3.4 - 5.0 g/dL    Protein Total 7.7 6.8 - 8.8 g/dL    Alkaline Phosphatase 67 40 - 150 U/L    ALT 24 0 - 50 U/L    AST 15 0 - 45 U/L   CBC with platelets   Result Value Ref Range    WBC 6.5 4.0 - 11.0 10e9/L    RBC Count 4.34 3.8 - 5.2 10e12/L    Hemoglobin 12.2 11.7 - 15.7 g/dL    Hematocrit 37.5 35.0 - 47.0 %    MCV 86 78 - 100 fl    MCH 28.1 26.5 - 33.0 pg    MCHC 32.5 31.5 - 36.5 g/dL    RDW 13.3 10.0 - 15.0 %    Platelet Count 217 150 - 450 10e9/L   Vitamin D Deficiency   Result Value Ref Range    Vitamin D Deficiency screening 40 20 - 75 ug/L   HIV Antigen Antibody Combo   Result Value Ref Range    HIV Antigen Antibody Combo Nonreactive NR^Nonreactive                 If you have any questions or concerns, please call the clinic at the number listed above.       Sincerely,        Aurelio Rivera MD

## 2018-11-06 NOTE — LETTER
November 21, 2018    Allyn Thurman  [address] [place] [address]    Dear Allyn,  We are happy to inform you that your PAP smear result from 11/06/18 is normal.  We are now able to do a follow up test on PAP smears. The DNA test is for HPV (Human Papilloma Virus). Cervical cancer is closely linked with certain types of HPV. Your results showed no evidence of high risk HPV.  Therefore we recommend you return in 3 years for your next pap smear.  You will still need to return to the clinic every year for an annual exam and other preventive tests.  If you have additional questions regarding this result, please call our registered nurse, Yasemin at [phone].  Sincerely,    Aurelio Rivera MD/[facility]

## 2018-11-06 NOTE — MR AVS SNAPSHOT
After Visit Summary   11/6/2018    Allyn Thurman    MRN: [id number]           Patient Information     Date Of Birth          1960        Visit Information        Provider Department      11/6/2018 8:40 AM Aurelio Rivera MD [facility]        Today's Diagnoses     Annual physical exam    -  1    Migraine with aura and without status migrainosus, not intractable        Chronic insomnia        Chronic vasomotor rhinitis        Vitamin D deficiency        Special screening for malignant neoplasms, colon        Encounter for screening mammogram for breast cancer          Care Instructions      Preventive Health Recommendations  Female Ages 50 - 64    Yearly exam: See your health care provider every year in order to  o Review health changes.   o Discuss preventive care.    o Review your medicines if your doctor has prescribed any.      Get a Pap test every three years (unless you have an abnormal result and your provider advises testing more often).    If you get Pap tests with HPV test, you only need to test every 5 years, unless you have an abnormal result.     You do not need a Pap test if your uterus was removed (hysterectomy) and you have not had cancer.    You should be tested each year for STDs (sexually transmitted diseases) if you're at risk.     Have a mammogram every 1 to 2 years.    Have a colonoscopy at age 50, or have a yearly FIT test (stool test). These exams screen for colon cancer.      Have a cholesterol test every 5 years, or more often if advised.    Have a diabetes test (fasting glucose) every three years. If you are at risk for diabetes, you should have this test more often.     If you are at risk for osteoporosis (brittle bone disease), think about having a bone density scan (DEXA).    Shots: Get a flu shot each year. Get a tetanus shot every 10 years.    Nutrition:     Eat at least 5 servings of fruits and vegetables each day.    Eat whole-grain bread,  "whole-wheat pasta and brown rice instead of white grains and rice.    Get adequate Calcium and Vitamin D.     Lifestyle    Exercise at least 150 minutes a week (30 minutes a day, 5 days a week). This will help you control your weight and prevent disease.    Limit alcohol to one drink per day.    No smoking.     Wear sunscreen to prevent skin cancer.     See your dentist every six months for an exam and cleaning.    See your eye doctor every 1 to 2 years.            Follow-ups after your visit        Additional Services     GASTROENTEROLOGY ADULT REF PROCEDURE ONLY Maria Teresa Tracy [phone]; No Provider Preference                 Follow-up notes from your care team     Return in about 1 year (around 11/6/2019) for Annual Physical Exam.      Future tests that were ordered for you today     Open Future Orders        Priority Expected Expires Ordered    *MA Screening Digital Bilateral Routine  11/6/2019 11/6/2018            Who to contact     If you have questions or need follow up information about today's clinic visit or your schedule please contact [facility] [place] directly at [phone].  Normal or non-critical lab and imaging results will be communicated to you by [employer], letter or phone within 4 business days after the clinic has received the results. If you do not hear from us within 7 days, please contact the clinic through [employer] or phone. If you have a critical or abnormal lab result, we will notify you by phone as soon as possible.  Submit refill requests through [employer] or call your pharmacy and they will forward the refill request to us. Please allow 3 business days for your refill to be completed.          Additional Information About Your Visit        [employer] Information     [employer] lets you send messages to your doctor, view your test results, renew your prescriptions, schedule appointments and more. To sign up, go to [email or web address] . Click on \"Log in\" on the left side " "of the screen, which will take you to the Welcome page. Then click on \"Sign up Now\" on the right side of the page.     You will be asked to enter the access code listed below, as well as some personal information. Please follow the directions to create your username and password.     Your access code is: [email or web address]  Expires: 2019  8:39 AM     Your access code will  in 90 days. If you need help or a new code, please call your [facility] clinic or [phone].        Care EveryWhere ID     This is your Care EveryWhere ID. This could be used by other organizations to access your [facility] medical records  [id number]        Your Vitals Were     Pulse Temperature Height Last Period Pulse Oximetry BMI (Body Mass Index)    66 97.9  F (36.6  C) (Tympanic) 5' 1.89\" (1.572 m) 2014 96% 35.61 kg/m2       Blood Pressure from Last 3 Encounters:   18 112/72   18 116/68   17 110/64    Weight from Last 3 Encounters:   18 194 lb (88 kg)   18 190 lb 6.4 oz (86.4 kg)   17 185 lb (83.9 kg)              We Performed the Following     CBC with platelets     Comprehensive metabolic panel     GASTROENTEROLOGY ADULT REF PROCEDURE ONLY Maria Teresa Tracy [phone]; No Provider Preference     HIV Antigen Antibody Combo     HPV High Risk Types DNA Cervical     Lipid Profile     Pap imaged thin layer screen with HPV - recommended age 30 - 65 years (select HPV order below)     TSH with free T4 reflex     Vitamin D Deficiency          Where to get your medicines      These medications were sent to [facility]/pharmacy #[id number] - [place], [place] - [address]  [address] [place] [facility] [place] [place] [address]     Phone:  [phone]     fluticasone 50 MCG/ACT spray    SUMAtriptan 50 MG tablet         Some of these will need a paper prescription and others can be bought over the counter.  Ask your nurse if you have questions.     Bring a paper prescription for each of these medications     zolpidem " 5 MG tablet          Primary Care Provider Office Phone # Fax #    Aurelio Rivera [phone]       [address]  [place] [address]        Equal Access to Services     IVONE SANTIAGO : Hadii kandice ku hadshahramo Sobenjaminali, waaxda luqadaha, qaybta kaalmada adeegyada, waxbarbra idiin kdn stephanie oneill laJocassandra hoang. So [facility] [phone].    ATENCIÓN: Si habla español, tiene a moraes disposición servicios gratuitos de asistencia lingüística. Llame al [phone].    We comply with applicable federal civil rights laws and [place] laws. We do not discriminate on the basis of race, color, national origin, age, disability, sex, sexual orientation, or gender identity.            Thank you!     Thank you for choosing [facility] [place]  for your care. Our goal is always to provide you with excellent care. Hearing back from our patients is one way we can continue to improve our services. Please take a few minutes to complete the written survey that you may receive in the mail after your visit with us. Thank you!             Your Updated Medication List - Protect others around you: Learn how to safely use, store and throw away your medicines at www.disposemymeds.org.          This list is accurate as of 11/6/18  9:19 AM.  Always use your most recent med list.                   Brand Name Dispense Instructions for use Diagnosis    B Complex Tabs      Take 1 tablet by mouth daily.        CENTRUM Tabs      Take 1 tablet by mouth daily.        cetirizine 10 MG tablet    zyrTEC    30 tablet    Take 1 tablet (10 mg) by mouth every morning    Lichen planus       fluticasone 50 MCG/ACT spray    FLONASE    48 mL    Spray 2 sprays into both nostrils daily    Chronic vasomotor rhinitis       METAMUCIL SMOOTH TEXTURE 63 % Powd   Generic drug:  psyllium     1 Bottle    Take 3 teaspoonful by mouth daily. Mix in 8 ounces of water        sertraline 100 MG tablet    ZOLOFT    135 tablet    TAKE 1.5 TABLETS (150 MG) BY  MOUTH DAILY    Major depressive disorder, single episode, moderate (H)       SUMAtriptan 50 MG tablet    IMITREX    9 tablet    Take 1 tablet (50 mg) by mouth at onset of headache for migraine May repeat dose in 2 hours if needed, max 4 tabs per 24 hours    Migraine with aura and without status migrainosus, not intractable       zolpidem 5 MG tablet    AMBIEN    90 tablet    TAKE 1 TABLET BY MOUTH ONCE NIGHTLY AS NEEDED FOR SLEEP.    Chronic insomnia

## 2018-11-06 NOTE — PROGRESS NOTES
SUBJECTIVE:   CC: Allyn Thurman is an 58 year old woman who presents for preventive health visit.     Healthy Habits:    Do you get at least three servings of calcium containing foods daily (dairy, green leafy vegetables, etc.)? yes    Amount of exercise or daily activities, outside of work: none day(s) per week    Problems taking medications regularly No    Medication side effects: No    Have you had an eye exam in the past two years? no    Do you see a dentist twice per year? yes    Do you have sleep apnea, excessive snoring or daytime drowsiness?no      Migraine Follow-Up    Headaches symptoms:  Stable       Able to do normal daily activities/work with migraines: Yes    Rescue/Relief medication:sumatriptan (Imitrex)              Effectiveness: moderate relief    Preventative medication: None    Neurologic complications: No new stroke-like symptoms, loss of vision or speech, numbness or weakness    In the past 4 weeks, how often have you gone to Urgent Care or the emergency room because of your headaches?  0     Needs refill on her other medications.      Today's PHQ-2 Score:   PHQ-2 ( 1999 Pfizer) 3/14/2017 5/4/2016   Q1: Little interest or pleasure in doing things 0 0   Q2: Feeling down, depressed or hopeless 0 0   PHQ-2 Score 0 0       Abuse: Current or Past(Physical, Sexual or Emotional)- No  Do you feel safe in your environment - Yes    Social History   Substance Use Topics     Smoking status: Former Smoker     Packs/day: 2.00     Years: 10.00     Types: Cigarettes     Quit date: 1/1/1989     Smokeless tobacco: Never Used     Alcohol use 0.0 oz/week     0 Standard drinks or equivalent per week      Comment: one drink per month     If you drink alcohol do you typically have >3 drinks per day or >7 drinks per week? No                     Reviewed orders with patient.  Reviewed health maintenance and updated orders accordingly - Yes  Labs reviewed in EPIC  BP Readings from Last 3 Encounters:   11/06/18  112/72   05/08/18 116/68   03/14/17 110/64    Wt Readings from Last 3 Encounters:   11/06/18 194 lb (88 kg)   05/08/18 190 lb 6.4 oz (86.4 kg)   03/14/17 185 lb (83.9 kg)                  Patient Active Problem List   Diagnosis     Vitamin D deficiency     Moderate Depression [296.22]     Chronic insomnia     Chronic rhinitis     Migraine with aura and without status migrainosus, not intractable     Advanced directives, counseling/discussion     Past Surgical History:   Procedure Laterality Date     C ECHO HEART XTHORACIC,COMPLETE, W/O DOPPLER  1/2008    normal     C TMJ RECONSTRUCTION  1985    right     COLONOSCOPY  5/2008    normal, repeat 10 years     HC SLEEP STUDY; ATTENDED  6/2009    AHI 0.7, RDI 4.4, max desat FIO2 90%, poor sleep architecture first 4 hours      HC STAB INCISIONS PHLEBECT VARI VEINS 1 EXT, 10-20 INC  1990    bilateral     LASIK  2005     TONSILLECTOMY  1967       Social History   Substance Use Topics     Smoking status: Former Smoker     Packs/day: 2.00     Years: 10.00     Types: Cigarettes     Quit date: 1/1/1989     Smokeless tobacco: Never Used     Alcohol use 0.0 oz/week     0 Standard drinks or equivalent per week      Comment: one drink per month     Family History   Problem Relation Age of Onset     Alcohol/Drug Mother      Depression Mother      attempted suicide age 76     Thyroid Disease Mother      hypothyroidism     Hypertension Mother      Diabetes Father      borderline diabetic     Eye Disorder Father      glaucoma, herpetic keratitis     Hypertension Father      Diabetes Paternal Uncle      type 2     Depression Brother      comitted suicide age 44     Respiratory Brother      sleep apnea     Respiratory Brother      asthma         Current Outpatient Prescriptions   Medication Sig Dispense Refill     B Complex TABS Take 1 tablet by mouth daily.       cetirizine (ZYRTEC) 10 MG tablet Take 1 tablet (10 mg) by mouth every morning 30 tablet 1     fluticasone (FLONASE) 50 MCG/ACT  spray Spray 2 sprays into both nostrils daily 48 mL 11     Multiple Vitamins-Minerals (CENTRUM) TABS Take 1 tablet by mouth daily.       psyllium (METAMUCIL SMOOTH TEXTURE) 63 % POWD Take 3 teaspoonful by mouth daily. Mix in 8 ounces of water 1 Bottle 11     sertraline (ZOLOFT) 100 MG tablet TAKE 1.5 TABLETS (150 MG) BY MOUTH DAILY 135 tablet 0     SUMAtriptan (IMITREX) 50 MG tablet Take 1 tablet (50 mg) by mouth at onset of headache for migraine May repeat dose in 2 hours if needed, max 4 tabs per 24 hours 9 tablet 3     zolpidem (AMBIEN) 5 MG tablet TAKE 1 TABLET BY MOUTH ONCE NIGHTLY AS NEEDED FOR SLEEP. 90 tablet 1     No Known Allergies    Patient over age 50, mutual decision to screen reflected in health maintenance.    Pertinent mammograms are reviewed under the imaging tab.  History of abnormal Pap smear: NO - age 30- 65 PAP every 3 years recommended  PAP / HPV Latest Ref Rng & Units 11/6/2018 1/20/2016 8/17/2012   PAP - NIL NIL NIL   HPV 16 DNA NEG:Negative Negative Negative -   HPV 18 DNA NEG:Negative Negative Negative -   OTHER HR HPV NEG:Negative Negative Negative -     Reviewed and updated as needed this visit by clinical staff  Tobacco  Allergies  Meds  Problems  Med Hx  Surg Hx  Fam Hx  Soc Hx          Reviewed and updated as needed this visit by Provider  Allergies  Problems            ROS:  CONSTITUTIONAL: NEGATIVE for fever, chills, change in weight  INTEGUMENTARY/SKIN: NEGATIVE for worrisome rashes, moles or lesions  EYES: NEGATIVE for vision changes or irritation  ENT: NEGATIVE for ear, mouth and throat problems  RESP: NEGATIVE for significant cough or SOB  BREAST: NEGATIVE for masses, tenderness or discharge  CV: NEGATIVE for chest pain, palpitations or peripheral edema  GI: NEGATIVE for nausea, abdominal pain, heartburn, or change in bowel habits  : NEGATIVE for unusual urinary or vaginal symptoms. No vaginal bleeding.  MUSCULOSKELETAL: NEGATIVE for significant arthralgias or  "myalgia  NEURO: NEGATIVE for weakness, dizziness or paresthesias  PSYCHIATRIC: NEGATIVE for changes in mood or affect     OBJECTIVE:   /72  Pulse 66  Temp 97.9  F (36.6  C) (Tympanic)  Ht 5' 1.89\" (1.572 m)  Wt 194 lb (88 kg)  LMP 01/03/2014  SpO2 96%  BMI 35.61 kg/m2  EXAM:  GENERAL APPEARANCE: healthy, alert and no distress  EYES: Eyes grossly normal to inspection, PERRL and conjunctivae and sclerae normal  HENT: ear canals and TM's normal, nose and mouth without ulcers or lesions, oropharynx clear and oral mucous membranes moist  NECK: no adenopathy, no asymmetry, masses, or scars and thyroid normal to palpation  RESP: lungs clear to auscultation - no rales, rhonchi or wheezes  BREAST: normal without masses, tenderness or nipple discharge and no palpable axillary masses or adenopathy  CV: regular rate and rhythm, normal S1 S2, no S3 or S4, no murmur, click or rub, no peripheral edema and peripheral pulses strong  ABDOMEN: soft, nontender, no hepatosplenomegaly, no masses and bowel sounds normal   (female): normal female external genitalia, normal urethral meatus, vaginal mucosal atrophy noted, normal cervix, adnexae, and uterus without masses or abnormal discharge  MS: no musculoskeletal defects are noted and gait is age appropriate without ataxia  SKIN: no suspicious lesions or rashes  NEURO: Normal strength and tone, sensory exam grossly normal, mentation intact and speech normal  PSYCH: mentation appears normal and affect normal/bright        ASSESSMENT/PLAN:   1. Annual physical exam  Screening Pap and fasting labs ordered.  - Pap imaged thin layer screen with HPV - recommended age 30 - 65 years (select HPV order below)  - HPV High Risk Types DNA Cervical  - Lipid Profile  - TSH with free T4 reflex  - Comprehensive metabolic panel  - CBC with platelets  - HIV Antigen Antibody Combo    2. Migraine with aura and without status migrainosus, not intractable  Well managed with the use of Imitrex.  - " "SUMAtriptan (IMITREX) 50 MG tablet; Take 1 tablet (50 mg) by mouth at onset of headache for migraine May repeat dose in 2 hours if needed, max 4 tabs per 24 hours  Dispense: 9 tablet; Refill: 3    3. Chronic insomnia  Refill on Ambien ordered to be used as needed.  - zolpidem (AMBIEN) 5 MG tablet; TAKE 1 TABLET BY MOUTH ONCE NIGHTLY AS NEEDED FOR SLEEP.  Dispense: 90 tablet; Refill: 1    4. Chronic vasomotor rhinitis  Flonase refilled  - fluticasone (FLONASE) 50 MCG/ACT spray; Spray 2 sprays into both nostrils daily  Dispense: 48 mL; Refill: 11    5. Vitamin D deficiency    - Vitamin D Deficiency    6. Special screening for malignant neoplasms, colon    - GASTROENTEROLOGY ADULT REF PROCEDURE ONLY Maria Teresa Tracy [phone]; No Provider Preference    7. Encounter for screening mammogram for breast cancer  - *MA Screening Digital Bilateral; Future    8. Need for prophylactic vaccination and inoculation against influenza    - Vaccine Administration, Initial [71307]  - FLU VACCINE, (RIV4) RECOMBINANT HA  , IM (FluBlok, egg free) [54625]- >18 YRS ([facility] recommended  50-64 YRS)    COUNSELING:   Reviewed preventive health counseling, as reflected in patient instructions       Regular exercise       Healthy diet/nutrition    BP Readings from Last 1 Encounters:   11/06/18 112/72     Estimated body mass index is 35.61 kg/(m^2) as calculated from the following:    Height as of this encounter: 5' 1.89\" (1.572 m).    Weight as of this encounter: 194 lb (88 kg).      Weight management plan: Discussed healthy diet and exercise guidelines and patient will follow up in 12 months in clinic to re-evaluate.     reports that she quit smoking about 29 years ago. Her smoking use included Cigarettes. She has a 20.00 pack-year smoking history. She has never used smokeless tobacco.      Counseling Resources:  ATP IV Guidelines  Pooled Cohorts Equation Calculator  Breast Cancer Risk Calculator  FRAX Risk Assessment  ICSI Preventive " Guidelines  Dietary Guidelines for Americans, 2010  USDA's MyPlate  ASA Prophylaxis  Lung CA Screening    Aurelio Rivera MD  [facility]    Injectable Influenza Immunization Documentation    1.  Is the person to be vaccinated sick today?   No    2. Does the person to be vaccinated have an allergy to a component   of the vaccine?   No  Egg Allergy Algorithm Link    3. Has the person to be vaccinated ever had a serious reaction   to influenza vaccine in the past?   No    4. Has the person to be vaccinated ever had Guillain-Barré syndrome?   No    Form completed by Hilda James CMA

## 2018-11-07 LAB
DEPRECATED CALCIDIOL+CALCIFEROL SERPL-MC: 40 UG/L (ref 20–75)
HIV 1+2 AB+HIV1 P24 AG SERPL QL IA: NONREACTIVE

## 2018-11-07 ASSESSMENT — ANXIETY QUESTIONNAIRES: GAD7 TOTAL SCORE: 4

## 2018-11-08 LAB
COPATH REPORT: NORMAL
PAP: NORMAL

## 2018-11-09 LAB
FINAL DIAGNOSIS: NORMAL
HPV HR 12 DNA CVX QL NAA+PROBE: NEGATIVE
HPV16 DNA SPEC QL NAA+PROBE: NEGATIVE
HPV18 DNA SPEC QL NAA+PROBE: NEGATIVE
SPECIMEN DESCRIPTION: NORMAL
SPECIMEN SOURCE CVX/VAG CYTO: NORMAL

## 2018-11-12 ENCOUNTER — TELEPHONE (OUTPATIENT)
Dept: FAMILY MEDICINE | Facility: CLINIC | Age: 58
End: 2018-11-12

## 2018-11-12 NOTE — TELEPHONE ENCOUNTER
[facility] Screening for was faxed and mailed to pt.    Copy made and sent to [facility].    Hilda James CMA

## 2019-01-14 ENCOUNTER — HOSPITAL ENCOUNTER (OUTPATIENT)
Facility: CLINIC | Age: 59
Discharge: HOME | End: 2019-01-14
Attending: INTERNAL MEDICINE | Admitting: INTERNAL MEDICINE
Payer: COMMERCIAL

## 2019-01-14 VITALS
DIASTOLIC BLOOD PRESSURE: 75 MMHG | OXYGEN SATURATION: 95 % | SYSTOLIC BLOOD PRESSURE: 119 MMHG | BODY MASS INDEX: 35.15 KG/M2 | HEIGHT: 62 IN | WEIGHT: 191 LBS | RESPIRATION RATE: 13 BRPM | HEART RATE: 56 BPM

## 2019-01-14 LAB — COLONOSCOPY: NORMAL

## 2019-01-14 PROCEDURE — 88305 TISSUE EXAM BY PATHOLOGIST: CPT | Mod: 26 | Performed by: INTERNAL MEDICINE

## 2019-01-14 PROCEDURE — G0500 MOD SEDAT ENDO SERVICE >5YRS: HCPCS | Performed by: INTERNAL MEDICINE

## 2019-01-14 PROCEDURE — 45380 COLONOSCOPY AND BIOPSY: CPT | Performed by: INTERNAL MEDICINE

## 2019-01-14 PROCEDURE — 40000104 ZZH STATISTIC MODERATE SEDATION < 10 MIN: Performed by: INTERNAL MEDICINE

## 2019-01-14 PROCEDURE — 88305 TISSUE EXAM BY PATHOLOGIST: CPT | Performed by: INTERNAL MEDICINE

## 2019-01-14 PROCEDURE — 25000128 H RX IP 250 OP 636: Performed by: INTERNAL MEDICINE

## 2019-01-14 RX ORDER — ONDANSETRON 4 MG/1
4 TABLET, ORALLY DISINTEGRATING ORAL EVERY 6 HOURS PRN
Status: CANCELLED | OUTPATIENT
Start: 2019-01-14

## 2019-01-14 RX ORDER — FENTANYL CITRATE 50 UG/ML
INJECTION, SOLUTION INTRAMUSCULAR; INTRAVENOUS PRN
Status: DISCONTINUED | OUTPATIENT
Start: 2019-01-14 | End: 2019-01-14 | Stop reason: HOSPADM

## 2019-01-14 RX ORDER — NALOXONE HYDROCHLORIDE 0.4 MG/ML
.1-.4 INJECTION, SOLUTION INTRAMUSCULAR; INTRAVENOUS; SUBCUTANEOUS
Status: CANCELLED | OUTPATIENT
Start: 2019-01-14 | End: 2019-01-15

## 2019-01-14 RX ORDER — ONDANSETRON 2 MG/ML
4 INJECTION INTRAMUSCULAR; INTRAVENOUS EVERY 6 HOURS PRN
Status: CANCELLED | OUTPATIENT
Start: 2019-01-14

## 2019-01-14 RX ORDER — LIDOCAINE 40 MG/G
CREAM TOPICAL
Status: DISCONTINUED | OUTPATIENT
Start: 2019-01-14 | End: 2019-01-14 | Stop reason: HOSPADM

## 2019-01-14 RX ORDER — ONDANSETRON 2 MG/ML
4 INJECTION INTRAMUSCULAR; INTRAVENOUS
Status: DISCONTINUED | OUTPATIENT
Start: 2019-01-14 | End: 2019-01-14 | Stop reason: HOSPADM

## 2019-01-14 RX ORDER — FLUMAZENIL 0.1 MG/ML
0.2 INJECTION, SOLUTION INTRAVENOUS
Status: CANCELLED | OUTPATIENT
Start: 2019-01-14 | End: 2019-01-15

## 2019-01-14 ASSESSMENT — MIFFLIN-ST. JEOR: SCORE: 1404.62

## 2019-01-15 LAB — COPATH REPORT: NORMAL

## 2019-01-23 DIAGNOSIS — F32.1 MAJOR DEPRESSIVE DISORDER, SINGLE EPISODE, MODERATE (H): ICD-10-CM

## 2019-01-23 RX ORDER — SERTRALINE HYDROCHLORIDE 100 MG/1
TABLET, FILM COATED ORAL
Qty: 135 TABLET | Refills: 0 | Status: SHIPPED | OUTPATIENT
Start: 2019-01-23 | End: 2019-04-22

## 2019-01-23 NOTE — TELEPHONE ENCOUNTER
"sertraline (ZOLOFT) 100 MG tablet      Last Written Prescription Date:  10/31/2018  Last Fill Quantity: 135,  # refills: 0   Last office visit: 11/6/2018 with prescribing provider:  Miguel   Future Office Visit:      Requested Prescriptions   Pending Prescriptions Disp Refills     sertraline (ZOLOFT) 100 MG tablet [Pharmacy Med Name: SERTRALINE  MG TABLET] 135 tablet 0     Sig: TAKE 1.5 TABLETS (150 MG) BY MOUTH DAILY    SSRIs Protocol Failed - 1/23/2019  3:48 PM       Failed - PHQ-9 score less than 5 in past 6 months    Please review last PHQ-9 score.          Passed - Medication is active on med list       Passed - Patient is age 18 or older       Passed - No active pregnancy on record       Passed - No positive pregnancy test in last 12 months       Passed - Recent (6 mo) or future (30 days) visit within the authorizing provider's specialty    Patient had office visit in the last 6 months or has a visit in the next 30 days with authorizing provider or within the authorizing provider's specialty.  See \"Patient Info\" tab in inbasket, or \"Choose Columns\" in Meds & Orders section of the refill encounter.            PHQ-9 SCORE 10/30/2017 5/8/2018 11/6/2018   PHQ-9 Total Score - - -   PHQ-9 Total Score 4 7 6       Prescription approved per [facility] Refill Protocol.  Chantell CHACONN, RN   [facility]     "

## 2019-04-22 DIAGNOSIS — F32.1 MAJOR DEPRESSIVE DISORDER, SINGLE EPISODE, MODERATE (H): ICD-10-CM

## 2019-04-22 NOTE — TELEPHONE ENCOUNTER
"Requested Prescriptions   Pending Prescriptions Disp Refills     sertraline (ZOLOFT) 100 MG tablet [Pharmacy Med Name: SERTRALINE  MG TABLET] 135 tablet 0     Sig: TAKE 1.5 TABLETS (150 MG) BY MOUTH DAILY       SSRIs Protocol Failed - 4/22/2019  1:31 AM        Failed - PHQ-9 score less than 5 in past 6 months     Please review last PHQ-9 score.           Passed - Medication is active on med list        Passed - Patient is age 18 or older        Passed - No active pregnancy on record        Passed - No positive pregnancy test in last 12 months        Passed - Recent (6 mo) or future (30 days) visit within the authorizing provider's specialty     Patient had office visit in the last 6 months or has a visit in the next 30 days with authorizing provider or within the authorizing provider's specialty.  See \"Patient Info\" tab in inbasket, or \"Choose Columns\" in Meds & Orders section of the refill encounter.            Last Written Prescription Date:  1/23/2019  Last Fill Quantity: 135 # refills: 0  Last office visit: 11/6/2018 with prescribing provider:    Future Office Visit:      PHQ-9 SCORE 10/30/2017 5/8/2018 11/6/2018   PHQ-9 Total Score - - -   PHQ-9 Total Score 4 7 6       "

## 2019-04-22 NOTE — TELEPHONE ENCOUNTER
Left a non detailed message for patient to return call.     Called to update PHQ-9.      Chantell CHACONN, RN   [facility]

## 2019-04-23 RX ORDER — SERTRALINE HYDROCHLORIDE 100 MG/1
TABLET, FILM COATED ORAL
Qty: 135 TABLET | Refills: 0 | Status: SHIPPED | OUTPATIENT
Start: 2019-04-23 | End: 2019-07-16

## 2019-04-23 ASSESSMENT — PATIENT HEALTH QUESTIONNAIRE - PHQ9: SUM OF ALL RESPONSES TO PHQ QUESTIONS 1-9: 2

## 2019-04-23 NOTE — TELEPHONE ENCOUNTER
PHQ-9 SCORE 5/8/2018 11/6/2018 4/23/2019   PHQ-9 Total Score - - -   PHQ-9 Total Score 7 6 2   Called patient to update PHQ-9    Prescription approved per [facility] Refill Protocol.    Chantell CHACONN, RN   [facility]

## 2019-05-28 DIAGNOSIS — F51.04 CHRONIC INSOMNIA: ICD-10-CM

## 2019-05-29 RX ORDER — ZOLPIDEM TARTRATE 5 MG/1
TABLET ORAL
Qty: 90 TABLET | Refills: 1 | Status: SHIPPED | OUTPATIENT
Start: 2019-05-29 | End: 2019-12-17

## 2019-05-29 NOTE — TELEPHONE ENCOUNTER
Prescription signed by Dr. Rivera and faxed to [id number] [place] [place] - [address]      .Aliyah CORTEZ    [facility]

## 2019-05-29 NOTE — TELEPHONE ENCOUNTER
Zolpidem 5mg      Last Written Prescription Date:  11/6/18  Last Fill Quantity: 90,   # refills: 1  Last Office Visit: 11/6/18  Future Office visit:       Routing refill request to provider for review/approval because:  Drug not on the [facility], [facility] or [facility] refill protocol or controlled substance    Hilda James CMA

## 2019-07-16 ENCOUNTER — ANCILLARY PROCEDURE (OUTPATIENT)
Dept: GENERAL RADIOLOGY | Facility: CLINIC | Age: 59
End: 2019-07-16
Attending: FAMILY MEDICINE
Payer: COMMERCIAL

## 2019-07-16 ENCOUNTER — OFFICE VISIT (OUTPATIENT)
Dept: FAMILY MEDICINE | Facility: CLINIC | Age: 59
End: 2019-07-16
Payer: COMMERCIAL

## 2019-07-16 VITALS
SYSTOLIC BLOOD PRESSURE: 112 MMHG | HEIGHT: 62 IN | TEMPERATURE: 98.3 F | DIASTOLIC BLOOD PRESSURE: 70 MMHG | WEIGHT: 172 LBS | OXYGEN SATURATION: 97 % | HEART RATE: 57 BPM | BODY MASS INDEX: 31.65 KG/M2

## 2019-07-16 DIAGNOSIS — Z12.31 ENCOUNTER FOR SCREENING MAMMOGRAM FOR BREAST CANCER: ICD-10-CM

## 2019-07-16 DIAGNOSIS — M25.522 BILATERAL ELBOW JOINT PAIN: ICD-10-CM

## 2019-07-16 DIAGNOSIS — M79.642 BILATERAL HAND PAIN: ICD-10-CM

## 2019-07-16 DIAGNOSIS — M79.671 RIGHT FOOT PAIN: ICD-10-CM

## 2019-07-16 DIAGNOSIS — Z23 NEED FOR VACCINATION: ICD-10-CM

## 2019-07-16 DIAGNOSIS — F32.1 MAJOR DEPRESSIVE DISORDER, SINGLE EPISODE, MODERATE (H): ICD-10-CM

## 2019-07-16 DIAGNOSIS — M79.641 BILATERAL HAND PAIN: Primary | ICD-10-CM

## 2019-07-16 DIAGNOSIS — M25.521 BILATERAL ELBOW JOINT PAIN: ICD-10-CM

## 2019-07-16 DIAGNOSIS — M79.642 BILATERAL HAND PAIN: Primary | ICD-10-CM

## 2019-07-16 DIAGNOSIS — I83.813 VARICOSE VEINS OF LEG WITH PAIN, BILATERAL: ICD-10-CM

## 2019-07-16 DIAGNOSIS — M79.641 BILATERAL HAND PAIN: ICD-10-CM

## 2019-07-16 DIAGNOSIS — M54.50 BILATERAL LOW BACK PAIN WITHOUT SCIATICA, UNSPECIFIED CHRONICITY: ICD-10-CM

## 2019-07-16 LAB
ERYTHROCYTE [DISTWIDTH] IN BLOOD BY AUTOMATED COUNT: 14 % (ref 10–15)
ERYTHROCYTE [SEDIMENTATION RATE] IN BLOOD BY WESTERGREN METHOD: 28 MM/H (ref 0–30)
HCT VFR BLD AUTO: 36.4 % (ref 35–47)
HGB BLD-MCNC: 11.7 G/DL (ref 11.7–15.7)
MCH RBC QN AUTO: 28.1 PG (ref 26.5–33)
MCHC RBC AUTO-ENTMCNC: 32.1 G/DL (ref 31.5–36.5)
MCV RBC AUTO: 88 FL (ref 78–100)
PLATELET # BLD AUTO: 177 10E9/L (ref 150–450)
RBC # BLD AUTO: 4.16 10E12/L (ref 3.8–5.2)
WBC # BLD AUTO: 5.8 10E9/L (ref 4–11)

## 2019-07-16 PROCEDURE — 73130 X-RAY EXAM OF HAND: CPT | Mod: 59

## 2019-07-16 PROCEDURE — 85027 COMPLETE CBC AUTOMATED: CPT | Performed by: FAMILY MEDICINE

## 2019-07-16 PROCEDURE — 90714 TD VACC NO PRESV 7 YRS+ IM: CPT | Performed by: FAMILY MEDICINE

## 2019-07-16 PROCEDURE — 85652 RBC SED RATE AUTOMATED: CPT | Performed by: FAMILY MEDICINE

## 2019-07-16 PROCEDURE — 86038 ANTINUCLEAR ANTIBODIES: CPT | Performed by: FAMILY MEDICINE

## 2019-07-16 PROCEDURE — 84550 ASSAY OF BLOOD/URIC ACID: CPT | Performed by: FAMILY MEDICINE

## 2019-07-16 PROCEDURE — 73630 X-RAY EXAM OF FOOT: CPT | Mod: RT

## 2019-07-16 PROCEDURE — 86431 RHEUMATOID FACTOR QUANT: CPT | Performed by: FAMILY MEDICINE

## 2019-07-16 PROCEDURE — 80053 COMPREHEN METABOLIC PANEL: CPT | Performed by: FAMILY MEDICINE

## 2019-07-16 PROCEDURE — 99214 OFFICE O/P EST MOD 30 MIN: CPT | Mod: 25 | Performed by: FAMILY MEDICINE

## 2019-07-16 PROCEDURE — 36415 COLL VENOUS BLD VENIPUNCTURE: CPT | Performed by: FAMILY MEDICINE

## 2019-07-16 PROCEDURE — 86140 C-REACTIVE PROTEIN: CPT | Performed by: FAMILY MEDICINE

## 2019-07-16 PROCEDURE — 90471 IMMUNIZATION ADMIN: CPT | Performed by: FAMILY MEDICINE

## 2019-07-16 RX ORDER — SERTRALINE HYDROCHLORIDE 100 MG/1
TABLET, FILM COATED ORAL
Qty: 135 TABLET | Refills: 1 | Status: SHIPPED | OUTPATIENT
Start: 2019-07-16 | End: 2019-12-18

## 2019-07-16 ASSESSMENT — ANXIETY QUESTIONNAIRES
6. BECOMING EASILY ANNOYED OR IRRITABLE: NOT AT ALL
5. BEING SO RESTLESS THAT IT IS HARD TO SIT STILL: NOT AT ALL
3. WORRYING TOO MUCH ABOUT DIFFERENT THINGS: SEVERAL DAYS
2. NOT BEING ABLE TO STOP OR CONTROL WORRYING: NOT AT ALL
1. FEELING NERVOUS, ANXIOUS, OR ON EDGE: SEVERAL DAYS
7. FEELING AFRAID AS IF SOMETHING AWFUL MIGHT HAPPEN: NOT AT ALL
GAD7 TOTAL SCORE: 2

## 2019-07-16 ASSESSMENT — MIFFLIN-ST. JEOR: SCORE: 1318.44

## 2019-07-16 ASSESSMENT — PATIENT HEALTH QUESTIONNAIRE - PHQ9
SUM OF ALL RESPONSES TO PHQ QUESTIONS 1-9: 5
5. POOR APPETITE OR OVEREATING: NOT AT ALL

## 2019-07-16 NOTE — PROGRESS NOTES
Subjective     Allyn Thurman is a 58 year old female who presents to clinic today for the following health issues:    HPI   Joint Pain  Patient reports that both her hands hurt.  Feels the pain mostly in the palms.  It is hard for her to lift the kids.  She uses computer a lot.  Both hands feel stiff.  Advil helps a bit but she does not want to use it too much.  Father also had arthritis.  Not sure what kind.  Does not want to get deformity.  Wants some more strength in her hands.  However had it x-rayed or any blood work done before.    Complains that both her elbow joints hurt off-and-on and feels swollen.  Denies any injury.  All the symptoms are going on for about a year now.    Complain of pain in her right foot.  Points to her first metatarsophalangeal joint for pain.  Walking for too long hurts.  Denies any swelling.  Pain going on for about a year now.    She has spider veins in both her legs.  Reports of generalized achiness in both her legs.  She has had vein stripping done in the past.  Things are getting worse again.    History of depression.  Currently using Zoloft 150 mg daily.  Symptoms are stable.  Would like a refill on the medication  PHQ-9 SCORE 11/6/2018 4/23/2019 7/16/2019   PHQ-9 Total Score - - -   PHQ-9 Total Score 6 2 5               Patient Active Problem List   Diagnosis     Vitamin D deficiency     Moderate Depression [296.22]     Chronic insomnia     Chronic rhinitis     Migraine with aura and without status migrainosus, not intractable     Advanced directives, counseling/discussion     Past Surgical History:   Procedure Laterality Date     C ECHO HEART XTHORACIC,COMPLETE, W/O DOPPLER  1/2008    normal     C TMJ RECONSTRUCTION  1985    right     COLONOSCOPY  5/2008    normal, repeat 10 years     COLONOSCOPY N/A 1/14/2019    Procedure: COMBINED COLONOSCOPY, SINGLE OR MULTIPLE BIOPSY/POLYPECTOMY BY BIOPSY;  Surgeon: Ford Montemayor MD;  Location: [facility] SLEEP STUDY;  ATTENDED  2009    AHI 0.7, RDI 4.4, max desat FIO2 90%, poor sleep architecture first 4 hours      HC STAB INCISIONS PHLEBECT VARI VEINS 1 EXT, 10-20 INC      bilateral     LASIK  2005     TONSILLECTOMY  1967       Social History     Tobacco Use     Smoking status: Former Smoker     Packs/day: 2.00     Years: 10.00     Pack years: 20.00     Types: Cigarettes     Last attempt to quit: 1989     Years since quittin.5     Smokeless tobacco: Never Used   Substance Use Topics     Alcohol use: Yes     Alcohol/week: 0.0 oz     Comment: one drink per month     Family History   Problem Relation Age of Onset     Alcohol/Drug Mother      Depression Mother         attempted suicide age 76     Thyroid Disease Mother         hypothyroidism     Hypertension Mother      Diabetes Father         borderline diabetic     Eye Disorder Father         glaucoma, herpetic keratitis     Hypertension Father      Diabetes Paternal Uncle         type 2     Depression Brother         comitted suicide age 44     Respiratory Brother         sleep apnea     Respiratory Brother         asthma         Current Outpatient Medications   Medication Sig Dispense Refill     cetirizine (ZYRTEC) 10 MG tablet Take 1 tablet (10 mg) by mouth every morning 30 tablet 1     Multiple Vitamins-Minerals (CENTRUM) TABS Take 1 tablet by mouth daily.       sertraline (ZOLOFT) 100 MG tablet TAKE 1.5 TABLETS (150 MG) BY MOUTH DAILY 135 tablet 1     SUMAtriptan (IMITREX) 50 MG tablet Take 1 tablet (50 mg) by mouth at onset of headache for migraine May repeat dose in 2 hours if needed, max 4 tabs per 24 hours 9 tablet 3     zolpidem (AMBIEN) 5 MG tablet TAKE 1 TABLET BY MOUTH ONCE NIGHTLY AS NEEDED FOR SLEEP 90 tablet 1     No Known Allergies      Reviewed and updated as needed this visit by Provider         Review of Systems   ROS COMP: CONSTITUTIONAL: NEGATIVE for fever, chills, change in weight  ENT/MOUTH: NEGATIVE for ear, mouth and throat problems  RESP:  "NEGATIVE for significant cough or SOB  CV: NEGATIVE for chest pain, palpitations or peripheral edema      Objective    /70   Pulse 57   Temp 98.3  F (36.8  C) (Tympanic)   Ht 1.575 m (5' 2\")   Wt 78 kg (172 lb)   LMP 01/03/2014   SpO2 97%   BMI 31.46 kg/m    Body mass index is 31.46 kg/m .  Physical Exam   GENERAL: healthy, alert and no distress  RESP: lungs clear to auscultation - no rales, rhonchi or wheezes  CV: regular rate and rhythm, normal S1 S2, no S3 or S4, no murmur, click or rub, no peripheral edema and peripheral pulses strong  MS: No pitting edema in the legs.  Spider veins noted bilaterally.  Bilateral hands: No swelling, no tenderness to palpate over the dorsum of the hands or finger joints.  No tenderness over the wrist joint.  Range of motion at the wrist bilaterally is normal.  Bilateral elbows with normal range of motion.  No obvious swelling or localized tenderness.  Right foot: Lateral part of the first metatarsophalangeal joint is slightly tender.  No swelling, erythema or ecchymosis noted.  PSYCH: mentation appears normal, affect normal/bright            Assessment & Plan     1. Bilateral hand pain  Recommending a work-up for autoimmune arthritis.  Await the test results.  Also recommending to proceed with an x-ray of hands bilaterally to see if there is any arthritic changes noted.  We may proceed with consulting with the rheumatologist versus seeing a physical therapist.  Patient verbalized agreement.  Use ibuprofen and Tylenol as needed.  - CRP inflammation  - Erythrocyte sedimentation rate auto  - CBC with platelets  - Comprehensive metabolic panel  - Rheumatoid factor  - Anti Nuclear Cathy IgG by IFA with Reflex  - XR Hand Bilateral G/E 3 Views; Future    2. Bilateral elbow joint pain  See the plan as above.  - CRP inflammation  - Erythrocyte sedimentation rate auto  - CBC with platelets  - Comprehensive metabolic panel  - Rheumatoid factor  - Anti Nuclear Cathy IgG by IFA with " "Reflex    3. Right foot pain  X-ray of the foot ordered.  No concerns for fracture.  Symptoms are chronic in nature.  Most likely she has some arthritic changes.  I would like to rule out gout.  Uric acid level ordered.  Await the blood work and x-ray results.  - Uric acid  - XR Foot Right G/E 3 Views; Future    4. Moderate Depression [296.22]  Symptoms are stable.  Resume current dose of medication  - sertraline (ZOLOFT) 100 MG tablet; TAKE 1.5 TABLETS (150 MG) BY MOUTH DAILY  Dispense: 135 tablet; Refill: 1    5. Encounter for screening mammogram for breast cancer    - MA Screen Bilateral w/Darryl; Future    6. Varicose veins of leg with pain, bilateral  Recommending to see vein clinic.  - VASCULAR SURGERY REFERRAL; Future    7. Need for vaccination    - TD PRSERV FREE >=7 YRS ADS IM [12452]  - 1st  Administration  [77587]     BMI:   Estimated body mass index is 31.46 kg/m  as calculated from the following:    Height as of this encounter: 1.575 m (5' 2\").    Weight as of this encounter: 78 kg (172 lb).       Return in about 4 months (around 11/8/2019) for Annual Physical Exam.    Aurelio Rivera MD  [facility] [place]      "

## 2019-07-16 NOTE — TELEPHONE ENCOUNTER
"Requested Prescriptions   Pending Prescriptions Disp Refills     sertraline (ZOLOFT) 100 MG tablet [Pharmacy Med Name: SERTRALINE  MG TABLET] 45 tablet 2     Sig: TAKE 1.5 TABLETS (150 MG) BY MOUTH DAILY  Last Written Prescription Date:  4/23/19  Last Fill Quantity: 135,  # refills: 0   Last office visit: 11/6/2018 with prescribing provider:  Miguel   Future Office Visit:   Next 5 appointments (look out 90 days)    Jul 16, 2019  5:20 PM CDT  Office Visit with Aurelio Rivera MD  [facility] ([facility] [place]) [address] [place] [id number]  [phone]                SSRIs Protocol Passed - 7/16/2019  1:36 AM        Passed - PHQ-9 score less than 5 in past 6 months     Please review last PHQ-9 score.  PHQ-9 SCORE 11/6/2018 4/23/2019 7/16/2019   PHQ-9 Total Score - - -   PHQ-9 Total Score 6 2 5                Passed - Medication is active on med list        Passed - Patient is age 18 or older        Passed - No active pregnancy on record        Passed - No positive pregnancy test in last 12 months        Passed - Recent (6 mo) or future (30 days) visit within the authorizing provider's specialty     Patient had office visit in the last 6 months or has a visit in the next 30 days with authorizing provider or within the authorizing provider's specialty.  See \"Patient Info\" tab in inbasket, or \"Choose Columns\" in Meds & Orders section of the refill encounter.              "

## 2019-07-17 ENCOUNTER — TELEPHONE (OUTPATIENT)
Dept: OTHER | Facility: CLINIC | Age: 59
End: 2019-07-17

## 2019-07-17 LAB
ALBUMIN SERPL-MCNC: 3.8 G/DL (ref 3.4–5)
ALP SERPL-CCNC: 67 U/L (ref 40–150)
ALT SERPL W P-5'-P-CCNC: 19 U/L (ref 0–50)
ANION GAP SERPL CALCULATED.3IONS-SCNC: 5 MMOL/L (ref 3–14)
AST SERPL W P-5'-P-CCNC: 12 U/L (ref 0–45)
BILIRUB SERPL-MCNC: 0.3 MG/DL (ref 0.2–1.3)
BUN SERPL-MCNC: 15 MG/DL (ref 7–30)
CALCIUM SERPL-MCNC: 8.8 MG/DL (ref 8.5–10.1)
CHLORIDE SERPL-SCNC: 108 MMOL/L (ref 94–109)
CO2 SERPL-SCNC: 26 MMOL/L (ref 20–32)
CREAT SERPL-MCNC: 0.66 MG/DL (ref 0.52–1.04)
CRP SERPL-MCNC: 5.2 MG/L (ref 0–8)
GFR SERPL CREATININE-BSD FRML MDRD: >90 ML/MIN/{1.73_M2}
GLUCOSE SERPL-MCNC: 88 MG/DL (ref 70–99)
POTASSIUM SERPL-SCNC: 4.2 MMOL/L (ref 3.4–5.3)
PROT SERPL-MCNC: 7 G/DL (ref 6.8–8.8)
SODIUM SERPL-SCNC: 139 MMOL/L (ref 133–144)
URATE SERPL-MCNC: 3.4 MG/DL (ref 2.6–6)

## 2019-07-17 RX ORDER — SERTRALINE HYDROCHLORIDE 100 MG/1
TABLET, FILM COATED ORAL
Qty: 45 TABLET | Refills: 2 | OUTPATIENT
Start: 2019-07-17

## 2019-07-17 ASSESSMENT — ANXIETY QUESTIONNAIRES: GAD7 TOTAL SCORE: 2

## 2019-07-17 NOTE — TELEPHONE ENCOUNTER
"Received referral via \"in-basket\", per  guidelines referral forwarded to [facility].    Veronica Balderas, INESN, RN    "

## 2019-07-17 NOTE — TELEPHONE ENCOUNTER
Rec'vd referral from PCP that pt needs to be seen at [facility] for varicose veins, I clld pt, LMOM

## 2019-07-18 LAB
ANA SER QL IF: NEGATIVE
RHEUMATOID FACT SER NEPH-ACNC: <20 IU/ML (ref 0–20)

## 2019-07-22 NOTE — TELEPHONE ENCOUNTER
Srinivas pt to schedule an appt with JAYJAY Stephen stating I will mail forms so she can schedule when convenient for her.

## 2019-07-23 NOTE — RESULT ENCOUNTER NOTE
Please call the patient to notify patient that the blood work is normal.  This includes the rheumatology work-up, inflammatory markers, blood counts, uric acid level, kidney and liver functions.  Her foot x-ray shows some osteoarthritic changes at the base of the big toe.   Some degenerative changes noted in the wrist bones on the left hand only.  Right hand x-ray is normal.    I would recommend physical therapy for hand pain bilaterally and the foot pain.  Referral placed.    Aurelio Rivera MD

## 2019-08-30 ENCOUNTER — OFFICE VISIT (OUTPATIENT)
Dept: FAMILY MEDICINE | Facility: CLINIC | Age: 59
End: 2019-08-30
Payer: COMMERCIAL

## 2019-08-30 VITALS
BODY MASS INDEX: 31.28 KG/M2 | WEIGHT: 170 LBS | OXYGEN SATURATION: 97 % | TEMPERATURE: 98.7 F | HEIGHT: 62 IN | DIASTOLIC BLOOD PRESSURE: 70 MMHG | SYSTOLIC BLOOD PRESSURE: 118 MMHG | HEART RATE: 68 BPM

## 2019-08-30 DIAGNOSIS — S49.91XA: Primary | ICD-10-CM

## 2019-08-30 DIAGNOSIS — L08.9 LOCAL INFECTION OF SKIN AND SUBCUTANEOUS TISSUE: ICD-10-CM

## 2019-08-30 PROCEDURE — 99214 OFFICE O/P EST MOD 30 MIN: CPT | Performed by: NURSE PRACTITIONER

## 2019-08-30 RX ORDER — CEPHALEXIN 500 MG/1
500 CAPSULE ORAL 3 TIMES DAILY
Qty: 21 CAPSULE | Refills: 0 | Status: SHIPPED | OUTPATIENT
Start: 2019-08-30 | End: 2020-02-05

## 2019-08-30 ASSESSMENT — MIFFLIN-ST. JEOR: SCORE: 1309.36

## 2019-08-30 NOTE — PATIENT INSTRUCTIONS
Keflex three times a day with food for 7 days    Apply bacitracin and a bandage after washing with water and mild soap daily    Let me know if you have fever, chills, or if the area begins draining anything (or if it gets really hot, red, and swollen).

## 2019-08-30 NOTE — PROGRESS NOTES
Subjective     Allyn Thurman is a 58 year old female who presents to clinic today for the following health issues:    HPI   Concern - Fish hook in right arm   Onset: x 45 min ago    Description:    TD is up to date       HPI; Allyn presents today, as a walk-in patient, with the complaint of fish hook embedded in her posterior right upper arm. She was fishing with her two grandsons today, and an errant cast sent and buried a hook into her right arm. There is still a worm on the hook. Tetanus booster given this year. No bleeding. Some pain.    Patient Active Problem List   Diagnosis     Vitamin D deficiency     Moderate Depression [296.22]     Chronic insomnia     Chronic rhinitis     Migraine with aura and without status migrainosus, not intractable     Advanced directives, counseling/discussion     Past Surgical History:   Procedure Laterality Date     C ECHO HEART XTHORACIC,COMPLETE, W/O DOPPLER  2008    normal     C TMJ RECONSTRUCTION  1985    right     COLONOSCOPY  2008    normal, repeat 10 years     COLONOSCOPY N/A 2019    Procedure: COMBINED COLONOSCOPY, SINGLE OR MULTIPLE BIOPSY/POLYPECTOMY BY BIOPSY;  Surgeon: Ford Montemayor MD;  Location:  GI     HC SLEEP STUDY; ATTENDED  2009    AHI 0.7, RDI 4.4, max desat FIO2 90%, poor sleep architecture first 4 hours      HC STAB INCISIONS PHLEBECT VARI VEINS 1 EXT, 10-20 INC      bilateral     LASIK       TONSILLECTOMY         Social History     Tobacco Use     Smoking status: Former Smoker     Packs/day: 2.00     Years: 10.00     Pack years: 20.00     Types: Cigarettes     Last attempt to quit: 1989     Years since quittin.6     Smokeless tobacco: Never Used   Substance Use Topics     Alcohol use: Yes     Alcohol/week: 0.0 oz     Comment: one drink per month     Family History   Problem Relation Age of Onset     Alcohol/Drug Mother      Depression Mother         attempted suicide age 76     Thyroid Disease Mother          "hypothyroidism     Hypertension Mother      Diabetes Father         borderline diabetic     Eye Disorder Father         glaucoma, herpetic keratitis     Hypertension Father      Diabetes Paternal Uncle         type 2     Depression Brother         comitted suicide age 44     Respiratory Brother         sleep apnea     Respiratory Brother         asthma           Reviewed and updated as needed this visit by Provider  Tobacco  Allergies  Meds  Problems  Med Hx  Surg Hx  Fam Hx         Review of Systems   ROS COMP: Constitutional, skin systems are negative, except as otherwise noted.      Objective    /70   Pulse 68   Temp 98.7  F (37.1  C) (Tympanic)   Ht 1.575 m (5' 2\")   Wt 77.1 kg (170 lb)   LMP 01/03/2014   SpO2 97%   BMI 31.09 kg/m    Body mass index is 31.09 kg/m .  Physical Exam   GENERAL: healthy, alert and no distress  SKIN: Right posterior upper arm. Fish hook fully embedded into arm. No bleeding or drainage. Mild erythema around site. Worm still attached to hook (against entry wound).   NEURO: Normal strength and tone, mentation intact and speech normal    Diagnostic Test Results:  none         Assessment & Plan     Allyn was seen today for retain fish hook. Site prepped with alcohol swab and 2 ml of 1% lidocaine with epinephrine was infiltrated around puncture site. After sufficient level of anesthesia was achieved, site was prepped again, this time with chlorhexidine gluconate. Sterile forceps was used to remove worm from hook, and a needle  was used to push barbed end of hook through the skin. When chelsie was visible, it was clipped off with large nail scissors, and hook was backed out through established track through tissue. No evident complications. Patient tolerated well. Bacitracin and sterile dressing applied. Wound care instructions provided, as well as strict follow up precautions. Due the fact that the hook had been in place within her tissue for over 30 minutes, I " "ordered a 7 day supply of Keflex for prophylactic reasons. Discussed reasons to call or return to clinic. Allyn acknowledges and demonstrates understanding of circumstances under which care should be sought urgently or emergently. Follow up as discussed. Discussed risks, benefits, alternatives, potential side effects, and proper administration of new medication / treatment. Agrees with plan of care. All questions answered.       Diagnoses and all orders for this visit:    Fish hook injury of right upper arm  Comment: See above.     Local infection of skin and subcutaneous tissue  -     cephALEXin (KEFLEX) 500 MG capsule; Take 1 capsule (500 mg) by mouth 3 times daily for 7 days         BMI:   Estimated body mass index is 31.09 kg/m  as calculated from the following:    Height as of this encounter: 1.575 m (5' 2\").    Weight as of this encounter: 77.1 kg (170 lb).               Return in about 1 week (around 9/6/2019) for persistent or worsening symptoms.    Manohar Kent NP  [facility] [place]    "

## 2019-10-02 ENCOUNTER — HEALTH MAINTENANCE LETTER (OUTPATIENT)
Age: 59
End: 2019-10-02

## 2019-12-16 ENCOUNTER — HEALTH MAINTENANCE LETTER (OUTPATIENT)
Age: 59
End: 2019-12-16

## 2019-12-16 DIAGNOSIS — F51.04 CHRONIC INSOMNIA: ICD-10-CM

## 2019-12-17 RX ORDER — ZOLPIDEM TARTRATE 5 MG/1
TABLET ORAL
Qty: 30 TABLET | Refills: 0 | Status: SHIPPED | OUTPATIENT
Start: 2019-12-17 | End: 2020-01-29

## 2019-12-17 NOTE — TELEPHONE ENCOUNTER
zolpidem (AMBIEN) 5 MG tablet      Last Written Prescription Date:  5-  Last Fill Quantity: 90 tablet,   # refills: 1  Last Office Visit: 8- Donte  Future Office visit:       Routing refill request to provider for review/approval because:  Drug not on the [facility], [facility] or [facility] refill protocol or controlled substance

## 2020-01-18 DIAGNOSIS — F32.1 MAJOR DEPRESSIVE DISORDER, SINGLE EPISODE, MODERATE (H): ICD-10-CM

## 2020-01-20 NOTE — TELEPHONE ENCOUNTER
"Requested Prescriptions   Pending Prescriptions Disp Refills     sertraline (ZOLOFT) 100 MG tablet [Pharmacy Med Name: SERTRALINE   Last Written Prescription Date:  12-  Last Fill Quantity: 45 tablet,  # refills: 0   Last office visit: 8/30/2019 with prescribing provider:     Future Office Visit:     MG TABLET] 45 tablet 0     Sig: TAKE 1.5 TABLETS BY MOUTH DAILY       SSRIs Protocol Failed - 1/18/2020 12:50 AM        Failed - PHQ-9 score less than 5 in past 6 months     Please review last PHQ-9 score.           Passed - Medication is active on med list        Passed - Patient is age 18 or older        Passed - No active pregnancy on record        Passed - No positive pregnancy test in last 12 months        Passed - Recent (6 mo) or future (30 days) visit within the authorizing provider's specialty     Patient had office visit in the last 6 months or has a visit in the next 30 days with authorizing provider or within the authorizing provider's specialty.  See \"Patient Info\" tab in inbasket, or \"Choose Columns\" in Meds & Orders section of the refill encounter.              "

## 2020-01-20 NOTE — TELEPHONE ENCOUNTER
Non detailed message left for pt to return call to clinic and ask to speak with a triage nurse.    Pt needs to do PHQ 9.    Jeannine HUANG RN  EP Triage

## 2020-01-21 RX ORDER — SERTRALINE HYDROCHLORIDE 100 MG/1
TABLET, FILM COATED ORAL
Qty: 45 TABLET | Refills: 0 | Status: SHIPPED | OUTPATIENT
Start: 2020-01-21 | End: 2020-02-19

## 2020-01-21 ASSESSMENT — PATIENT HEALTH QUESTIONNAIRE - PHQ9: SUM OF ALL RESPONSES TO PHQ QUESTIONS 1-9: 3

## 2020-01-22 NOTE — TELEPHONE ENCOUNTER
PHQ-9 SCORE 4/23/2019 7/16/2019 1/21/2020   PHQ-9 Total Score - - -   PHQ-9 Total Score 2 5 3     Patient is scheduled for CPE on 2/19.  Medication is being filled for 1 time refill only due to:  Patient needs to be seen because due for 6 month depression follow up appointment. Allyn Claros RN

## 2020-01-28 DIAGNOSIS — F51.04 CHRONIC INSOMNIA: ICD-10-CM

## 2020-01-29 RX ORDER — ZOLPIDEM TARTRATE 5 MG/1
TABLET ORAL
Qty: 30 TABLET | Refills: 1 | Status: SHIPPED | OUTPATIENT
Start: 2020-01-29 | End: 2020-04-30

## 2020-01-29 NOTE — TELEPHONE ENCOUNTER
Requested Prescriptions   Pending Prescriptions Disp Refills     zolpidem (AMBIEN) 5 MG tablet 30 tablet 0     Sig: TAKE 1 TABLET BY MOUTH AT BEDTIME       There is no refill protocol information for this order        zolpidem (AMBIEN) 5 MG tablet 30 tablet 0 12/17/2019       Last Written Prescription Date:  12/17/2019  Last Fill Quantity: 30,  # refills: 0   Last office visit: 8/30/2019 with prescribing provider:  Dr. Kent   Future Office Visit: 2/19/2020  Next 5 appointments (look out 90 days)    Feb 19, 2020  8:40 AM CST  PHYSICAL with Aurleio Rivera MD  [facility] ([facility]) [address] [place] [id number] [phone]

## 2020-02-05 ENCOUNTER — OFFICE VISIT (OUTPATIENT)
Dept: FAMILY MEDICINE | Facility: CLINIC | Age: 60
End: 2020-02-05
Payer: COMMERCIAL

## 2020-02-05 ENCOUNTER — TELEPHONE (OUTPATIENT)
Dept: FAMILY MEDICINE | Facility: CLINIC | Age: 60
End: 2020-02-05

## 2020-02-05 VITALS
HEIGHT: 62 IN | TEMPERATURE: 98.1 F | HEART RATE: 65 BPM | OXYGEN SATURATION: 100 % | WEIGHT: 153 LBS | SYSTOLIC BLOOD PRESSURE: 124 MMHG | BODY MASS INDEX: 28.16 KG/M2 | DIASTOLIC BLOOD PRESSURE: 66 MMHG

## 2020-02-05 DIAGNOSIS — M79.89 RIGHT LEG SWELLING: Primary | ICD-10-CM

## 2020-02-05 LAB — D DIMER PPP FEU-MCNC: 0.3 UG/ML FEU (ref 0–0.5)

## 2020-02-05 PROCEDURE — 99214 OFFICE O/P EST MOD 30 MIN: CPT | Performed by: FAMILY MEDICINE

## 2020-02-05 PROCEDURE — 85379 FIBRIN DEGRADATION QUANT: CPT | Performed by: FAMILY MEDICINE

## 2020-02-05 PROCEDURE — 36415 COLL VENOUS BLD VENIPUNCTURE: CPT | Performed by: FAMILY MEDICINE

## 2020-02-05 ASSESSMENT — ANXIETY QUESTIONNAIRES
GAD7 TOTAL SCORE: 1
5. BEING SO RESTLESS THAT IT IS HARD TO SIT STILL: NOT AT ALL
1. FEELING NERVOUS, ANXIOUS, OR ON EDGE: SEVERAL DAYS
7. FEELING AFRAID AS IF SOMETHING AWFUL MIGHT HAPPEN: NOT AT ALL
IF YOU CHECKED OFF ANY PROBLEMS ON THIS QUESTIONNAIRE, HOW DIFFICULT HAVE THESE PROBLEMS MADE IT FOR YOU TO DO YOUR WORK, TAKE CARE OF THINGS AT HOME, OR GET ALONG WITH OTHER PEOPLE: NOT DIFFICULT AT ALL
2. NOT BEING ABLE TO STOP OR CONTROL WORRYING: NOT AT ALL
3. WORRYING TOO MUCH ABOUT DIFFERENT THINGS: NOT AT ALL
6. BECOMING EASILY ANNOYED OR IRRITABLE: NOT AT ALL

## 2020-02-05 ASSESSMENT — PATIENT HEALTH QUESTIONNAIRE - PHQ9
5. POOR APPETITE OR OVEREATING: NOT AT ALL
SUM OF ALL RESPONSES TO PHQ QUESTIONS 1-9: 3

## 2020-02-05 ASSESSMENT — MIFFLIN-ST. JEOR: SCORE: 1227.25

## 2020-02-05 NOTE — PROGRESS NOTES
Subjective     Allyn Thurman is a 59 year old female who presents to clinic today for the following health issues:    HPI   Joint Pain    Onset: x5 days     Description:   Location: Right Achilles  Character: aches, tightness     Intensity: 4/10     Progression of Symptoms: worse     Accompanying Signs & Symptoms:  Other symptoms: radiation of pain to up leg.  Denies any pain with ankle range of motion.  She does not play any sports.  She does not run.  Denies any bruising anywhere.  She is able to walk okay but feels the discomfort which concerns her.  Worried about a blood clot.  She is never had blood clot in the past.  No associated shortness of breath or chest pain.  Denies any rash.  No itching    History:   Previous similar pain: no    Precipitating factors:   Trauma or overuse: no    Alleviating factors:  Improved by: nothing     Therapies Tried and outcome: nothing          Patient Active Problem List   Diagnosis     Vitamin D deficiency     Moderate Depression [296.22]     Chronic insomnia     Chronic rhinitis     Migraine with aura and without status migrainosus, not intractable     Advanced directives, counseling/discussion     Past Surgical History:   Procedure Laterality Date     C ECHO HEART XTHORACIC,COMPLETE, W/O DOPPLER  1/2008    normal     C TMJ RECONSTRUCTION  1985    right     COLONOSCOPY  5/2008    normal, repeat 10 years     COLONOSCOPY N/A 1/14/2019    Procedure: COMBINED COLONOSCOPY, SINGLE OR MULTIPLE BIOPSY/POLYPECTOMY BY BIOPSY;  Surgeon: Ford Montemayor MD;  Location:  GI      SLEEP STUDY; ATTENDED  6/2009    AHI 0.7, RDI 4.4, max desat FIO2 90%, poor sleep architecture first 4 hours      HC STAB INCISIONS PHLEBECT VARI VEINS 1 EXT, 10-20 INC  1990    bilateral     LASIK  2005     TONSILLECTOMY  1967       Social History     Tobacco Use     Smoking status: Former Smoker     Packs/day: 2.00     Years: 10.00     Pack years: 20.00     Types: Cigarettes     Last attempt to  "quit: 1989     Years since quittin.1     Smokeless tobacco: Never Used   Substance Use Topics     Alcohol use: Yes     Alcohol/week: 0.0 standard drinks     Comment: one drink per month     Family History   Problem Relation Age of Onset     Alcohol/Drug Mother      Depression Mother         attempted suicide age 76     Thyroid Disease Mother         hypothyroidism     Hypertension Mother      Diabetes Father         borderline diabetic     Eye Disorder Father         glaucoma, herpetic keratitis     Hypertension Father      Diabetes Paternal Uncle         type 2     Depression Brother         comitted suicide age 44     Respiratory Brother         sleep apnea     Respiratory Brother         asthma         Current Outpatient Medications   Medication Sig Dispense Refill     cetirizine (ZYRTEC) 10 MG tablet Take 1 tablet (10 mg) by mouth every morning 30 tablet 1     Multiple Vitamins-Minerals (CENTRUM) TABS Take 1 tablet by mouth daily.       sertraline (ZOLOFT) 100 MG tablet TAKE 1.5 TABLETS BY MOUTH DAILY 45 tablet 0     SUMAtriptan (IMITREX) 50 MG tablet Take 1 tablet (50 mg) by mouth at onset of headache for migraine May repeat dose in 2 hours if needed, max 4 tabs per 24 hours 9 tablet 3     zolpidem (AMBIEN) 5 MG tablet TAKE 1 TABLET BY MOUTH AT BEDTIME 30 tablet 1     No Known Allergies    Reviewed and updated as needed this visit by Provider         Review of Systems   ROS COMP: CONSTITUTIONAL: NEGATIVE for fever, chills, change in weight  ENT/MOUTH: NEGATIVE for ear, mouth and throat problems  RESP: NEGATIVE for significant cough or SOB  CV: NEGATIVE for chest pain, palpitations or peripheral edema      Objective    /66 (BP Location: Left arm, Patient Position: Sitting, Cuff Size: Adult Regular)   Pulse 65   Temp 98.1  F (36.7  C) (Oral)   Ht 1.575 m (5' 2\")   Wt 69.4 kg (153 lb)   LMP 2014   SpO2 100%   BMI 27.98 kg/m    Body mass index is 27.98 kg/m .  Physical Exam   GENERAL: " "healthy, alert and no distress  NECK: no adenopathy, no asymmetry, masses, or scars and thyroid normal to palpation  RESP: lungs clear to auscultation - no rales, rhonchi or wheezes  CV: regular rate and rhythm, normal S1 S2, no S3 or S4, no murmur, click or rub, no peripheral edema and peripheral pulses strong  ABDOMEN: soft, nontender, no hepatosplenomegaly, no masses and bowel sounds normal  MS: Right lower extremity exam: Minimal swelling with no pitting noted around the right ankle.  Range of motion of the ankle is normal.  No tenderness over the Achilles muscle or tendon.  Paige test is normal which rules out rupture.  No tenderness over the ankle joint or foot.  No calf tenderness.          Results for orders placed or performed in visit on 02/05/20   D dimer, quantitative     Status: None   Result Value Ref Range    D Dimer 0.3 0.0 - 0.50 ug/ml FEU         Assessment & Plan     1. Right leg swelling  Probability of DVT is low but I cannot rule it out.  D-dimer ordered stat.  D-dimer came back negative.  Which reassures that this is not due to a blood clot.  Her symptoms are likely due to some sprain of muscle or tendon in the affected area.  Recommending to use ibuprofen, ice the affected area, elevate.  She may use an Ace wrap for light compression.  Use a comfortable shoe wear.  Avoid any physical activity that further aggravates pain.  If symptoms are getting worse and not improving in the next few days, instructed to notify us back.  Patient verbalized understanding and agreement and she is reassured.  - D dimer, quantitative     BMI:   Estimated body mass index is 27.98 kg/m  as calculated from the following:    Height as of this encounter: 1.575 m (5' 2\").    Weight as of this encounter: 69.4 kg (153 lb).         Aurelio Rivera MD  [facility] [place]

## 2020-02-06 ASSESSMENT — ANXIETY QUESTIONNAIRES: GAD7 TOTAL SCORE: 1

## 2020-02-19 ENCOUNTER — OFFICE VISIT (OUTPATIENT)
Dept: FAMILY MEDICINE | Facility: CLINIC | Age: 60
End: 2020-02-19
Payer: COMMERCIAL

## 2020-02-19 ENCOUNTER — ANCILLARY PROCEDURE (OUTPATIENT)
Dept: MAMMOGRAPHY | Facility: CLINIC | Age: 60
End: 2020-02-19
Payer: COMMERCIAL

## 2020-02-19 VITALS
BODY MASS INDEX: 28.71 KG/M2 | SYSTOLIC BLOOD PRESSURE: 118 MMHG | HEART RATE: 58 BPM | WEIGHT: 156 LBS | OXYGEN SATURATION: 98 % | DIASTOLIC BLOOD PRESSURE: 60 MMHG | HEIGHT: 62 IN | TEMPERATURE: 98 F

## 2020-02-19 DIAGNOSIS — Z12.31 ENCOUNTER FOR SCREENING MAMMOGRAM FOR BREAST CANCER: ICD-10-CM

## 2020-02-19 DIAGNOSIS — Z00.00 ROUTINE GENERAL MEDICAL EXAMINATION AT A HEALTH CARE FACILITY: Primary | ICD-10-CM

## 2020-02-19 DIAGNOSIS — G43.109 MIGRAINE WITH AURA AND WITHOUT STATUS MIGRAINOSUS, NOT INTRACTABLE: ICD-10-CM

## 2020-02-19 DIAGNOSIS — F32.1 MAJOR DEPRESSIVE DISORDER, SINGLE EPISODE, MODERATE (H): ICD-10-CM

## 2020-02-19 LAB
CHOLEST SERPL-MCNC: 255 MG/DL
ERYTHROCYTE [DISTWIDTH] IN BLOOD BY AUTOMATED COUNT: 13.8 % (ref 10–15)
GLUCOSE SERPL-MCNC: 96 MG/DL (ref 70–99)
HCT VFR BLD AUTO: 36.3 % (ref 35–47)
HDLC SERPL-MCNC: 85 MG/DL
HGB BLD-MCNC: 11.7 G/DL (ref 11.7–15.7)
LDLC SERPL CALC-MCNC: 161 MG/DL
MCH RBC QN AUTO: 28.7 PG (ref 26.5–33)
MCHC RBC AUTO-ENTMCNC: 32.2 G/DL (ref 31.5–36.5)
MCV RBC AUTO: 89 FL (ref 78–100)
NONHDLC SERPL-MCNC: 170 MG/DL
PLATELET # BLD AUTO: 195 10E9/L (ref 150–450)
RBC # BLD AUTO: 4.07 10E12/L (ref 3.8–5.2)
TRIGL SERPL-MCNC: 46 MG/DL
WBC # BLD AUTO: 6.1 10E9/L (ref 4–11)

## 2020-02-19 PROCEDURE — 85027 COMPLETE CBC AUTOMATED: CPT | Performed by: FAMILY MEDICINE

## 2020-02-19 PROCEDURE — 77067 SCR MAMMO BI INCL CAD: CPT | Mod: TC

## 2020-02-19 PROCEDURE — 82947 ASSAY GLUCOSE BLOOD QUANT: CPT | Performed by: FAMILY MEDICINE

## 2020-02-19 PROCEDURE — 99213 OFFICE O/P EST LOW 20 MIN: CPT | Mod: 25 | Performed by: FAMILY MEDICINE

## 2020-02-19 PROCEDURE — 36415 COLL VENOUS BLD VENIPUNCTURE: CPT | Performed by: FAMILY MEDICINE

## 2020-02-19 PROCEDURE — 80061 LIPID PANEL: CPT | Performed by: FAMILY MEDICINE

## 2020-02-19 PROCEDURE — 77063 BREAST TOMOSYNTHESIS BI: CPT | Mod: TC

## 2020-02-19 PROCEDURE — 99396 PREV VISIT EST AGE 40-64: CPT | Performed by: FAMILY MEDICINE

## 2020-02-19 RX ORDER — SUMATRIPTAN 50 MG/1
50 TABLET, FILM COATED ORAL
Qty: 9 TABLET | Refills: 3 | Status: SHIPPED | OUTPATIENT
Start: 2020-02-19 | End: 2022-03-30

## 2020-02-19 RX ORDER — SERTRALINE HYDROCHLORIDE 100 MG/1
TABLET, FILM COATED ORAL
Qty: 90 TABLET | Refills: 1 | Status: SHIPPED | OUTPATIENT
Start: 2020-02-19 | End: 2020-09-24

## 2020-02-19 ASSESSMENT — MIFFLIN-ST. JEOR: SCORE: 1244.11

## 2020-02-19 ASSESSMENT — PATIENT HEALTH QUESTIONNAIRE - PHQ9
SUM OF ALL RESPONSES TO PHQ QUESTIONS 1-9: 2
5. POOR APPETITE OR OVEREATING: NOT AT ALL

## 2020-02-19 ASSESSMENT — ANXIETY QUESTIONNAIRES
7. FEELING AFRAID AS IF SOMETHING AWFUL MIGHT HAPPEN: NOT AT ALL
2. NOT BEING ABLE TO STOP OR CONTROL WORRYING: NOT AT ALL
3. WORRYING TOO MUCH ABOUT DIFFERENT THINGS: NOT AT ALL
GAD7 TOTAL SCORE: 0
6. BECOMING EASILY ANNOYED OR IRRITABLE: NOT AT ALL
1. FEELING NERVOUS, ANXIOUS, OR ON EDGE: NOT AT ALL
5. BEING SO RESTLESS THAT IT IS HARD TO SIT STILL: NOT AT ALL

## 2020-02-19 NOTE — PROGRESS NOTES
SUBJECTIVE:   CC: Allyn Thurman is an 59 year old woman who presents for preventive health visit.     Healthy Habits:    Do you get at least three servings of calcium containing foods daily (dairy, green leafy vegetables, etc.)? yes    Amount of exercise or daily activities, outside of work: 3-4 day(s) per week    Problems taking medications regularly No    Medication side effects: No    Have you had an eye exam in the past two years? no    Do you see a dentist twice per year? no    Do you have sleep apnea, excessive snoring or daytime drowsiness?no      Depression Followup    How are you doing with your depression since your last visit? Improved     Are you having other symptoms that might be associated with depression? No    Have you had a significant life event?  No     Are you feeling anxious or having panic attacks?   No    Do you have any concerns with your use of alcohol or other drugs? No    Social History     Tobacco Use     Smoking status: Former Smoker     Packs/day: 2.00     Years: 10.00     Pack years: 20.00     Types: Cigarettes     Last attempt to quit: 1989     Years since quittin.1     Smokeless tobacco: Never Used   Substance Use Topics     Alcohol use: Yes     Alcohol/week: 0.0 standard drinks     Comment: one drink per month     Drug use: No     PHQ 2020   PHQ-9 Total Score 3 3 2   Q9: Thoughts of better off dead/self-harm past 2 weeks Not at all Not at all Not at all     AMADOR-7 SCORE 2019   Total Score - - -   Total Score 2 1 0           Suicide Assessment Five-step Evaluation and Treatment (SAFE-T)    Migraine     Since your last clinic visit, how have your headaches changed?  Improved    How often are you getting headaches or migraines?  Not had one in the last 1 year.    Are you able to do normal daily activities when you have a migraine? Yes    Are you taking rescue/relief medications? (Select all that apply) sumatriptan  (Imitrex)    How helpful is your rescue/relief medication?  I get total relief    Are you taking any medications to prevent migraines? (Select all that apply)  No    In the past 4 weeks, how often have you gone to urgent care or the emergency room because of your headaches?  0      Today's PHQ-2 Score:   PHQ-2 (  [employer]) 2020 3/14/2017   Q1: Little interest or pleasure in doing things 0 0   Q2: Feeling down, depressed or hopeless 0 0   PHQ-2 Score 0 0       Abuse: Current or Past(Physical, Sexual or Emotional)- No  Do you feel safe in your environment? Yes        Social History     Tobacco Use     Smoking status: Former Smoker     Packs/day: 2.00     Years: 10.00     Pack years: 20.00     Types: Cigarettes     Last attempt to quit: 1989     Years since quittin.1     Smokeless tobacco: Never Used   Substance Use Topics     Alcohol use: Yes     Alcohol/week: 0.0 standard drinks     Comment: one drink per month     If you drink alcohol do you typically have >3 drinks per day or >7 drinks per week? No                     Reviewed orders with patient.  Reviewed health maintenance and updated orders accordingly - Yes  BP Readings from Last 3 Encounters:   20 118/60   20 124/66   19 118/70    Wt Readings from Last 3 Encounters:   20 70.8 kg (156 lb)   20 69.4 kg (153 lb)   19 77.1 kg (170 lb)                  Patient Active Problem List   Diagnosis     Vitamin D deficiency     Moderate Depression [296.22]     Chronic insomnia     Chronic rhinitis     Migraine with aura and without status migrainosus, not intractable     Advanced directives, counseling/discussion     Past Surgical History:   Procedure Laterality Date     C ECHO HEART XTHORACIC,COMPLETE, W/O DOPPLER  2008    normal     C TMJ RECONSTRUCTION  1985    right     COLONOSCOPY  2008    normal, repeat 10 years     COLONOSCOPY N/A 2019    Procedure: COMBINED COLONOSCOPY, SINGLE OR MULTIPLE  BIOPSY/POLYPECTOMY BY BIOPSY;  Surgeon: Ford Montemayor MD;  Location: [facility] SLEEP STUDY; ATTENDED  2009    AHI 0.7, RDI 4.4, max desat FIO2 90%, poor sleep architecture first 4 hours      HC STAB INCISIONS PHLEBECT VARI VEINS 1 EXT, 10-20 INC      bilateral     LASIK  2005     TONSILLECTOMY  1967       Social History     Tobacco Use     Smoking status: Former Smoker     Packs/day: 2.00     Years: 10.00     Pack years: 20.00     Types: Cigarettes     Last attempt to quit: 1989     Years since quittin.1     Smokeless tobacco: Never Used   Substance Use Topics     Alcohol use: Yes     Alcohol/week: 0.0 standard drinks     Comment: one drink per month     Family History   Problem Relation Age of Onset     Alcohol/Drug Mother      Depression Mother         attempted suicide age 76     Thyroid Disease Mother         hypothyroidism     Hypertension Mother      Diabetes Father         borderline diabetic     Eye Disorder Father         glaucoma, herpetic keratitis     Hypertension Father      Diabetes Paternal Uncle         type 2     Depression Brother         comitted suicide age 44     Respiratory Brother         sleep apnea     Respiratory Brother         asthma         Current Outpatient Medications   Medication Sig Dispense Refill     cetirizine (ZYRTEC) 10 MG tablet Take 1 tablet (10 mg) by mouth every morning 30 tablet 1     Multiple Vitamins-Minerals (CENTRUM) TABS Take 1 tablet by mouth daily.       sertraline 100 MG PO tablet TAKE 1 TABLET BY MOUTH DAILY 90 tablet 1     SUMAtriptan (IMITREX) 50 MG PO tablet Take 1 tablet (50 mg) by mouth at onset of headache for migraine May repeat dose in 2 hours if needed, max 4 tabs per 24 hours 9 tablet 3     zolpidem (AMBIEN) 5 MG tablet TAKE 1 TABLET BY MOUTH AT BEDTIME 30 tablet 1     No Known Allergies    Mammogram Screening: Patient over age 50, mutual decision to screen reflected in health maintenance.    Pertinent mammograms are reviewed  "under the imaging tab.  History of abnormal Pap smear: NO - age 30-65 PAP every 5 years with negative HPV co-testing recommended  PAP / HPV Latest Ref Rng & Units 11/6/2018 1/20/2016 8/17/2012   PAP - NIL NIL NIL   HPV 16 DNA NEG:Negative Negative Negative -   HPV 18 DNA NEG:Negative Negative Negative -   OTHER HR HPV NEG:Negative Negative Negative -     Reviewed and updated as needed this visit by clinical staff  Tobacco  Allergies  Meds  Soc Hx        Reviewed and updated as needed this visit by Provider  Tobacco  Allergies  Soc Hx           ROS:  CONSTITUTIONAL: NEGATIVE for fever, chills, change in weight  INTEGUMENTARY/SKIN: NEGATIVE for worrisome rashes, moles or lesions  EYES: NEGATIVE for vision changes or irritation  ENT: NEGATIVE for ear, mouth and throat problems  RESP: NEGATIVE for significant cough or SOB  BREAST: NEGATIVE for masses, tenderness or discharge  CV: NEGATIVE for chest pain, palpitations or peripheral edema  GI: NEGATIVE for nausea, abdominal pain, heartburn, or change in bowel habits  : NEGATIVE for unusual urinary or vaginal symptoms. No vaginal bleeding.  MUSCULOSKELETAL: NEGATIVE for significant arthralgias or myalgia  NEURO: NEGATIVE for weakness, dizziness or paresthesias  PSYCHIATRIC: NEGATIVE for changes in mood or affect     OBJECTIVE:   /60   Pulse 58   Temp 98  F (36.7  C) (Tympanic)   Ht 1.58 m (5' 2.21\")   Wt 70.8 kg (156 lb)   LMP 01/03/2014   SpO2 98%   BMI 28.35 kg/m    EXAM:  GENERAL APPEARANCE: healthy, alert and no distress  EYES: Eyes grossly normal to inspection, PERRL and conjunctivae and sclerae normal  HENT: ear canals and TM's normal, nose and mouth without ulcers or lesions, oropharynx clear and oral mucous membranes moist  NECK: no adenopathy, no asymmetry, masses, or scars and thyroid normal to palpation  RESP: lungs clear to auscultation - no rales, rhonchi or wheezes  BREAST: normal without masses, tenderness or nipple discharge and no " "palpable axillary masses or adenopathy  CV: regular rate and rhythm, normal S1 S2, no S3 or S4, no murmur, click or rub, no peripheral edema and peripheral pulses strong  ABDOMEN: soft, nontender, no hepatosplenomegaly, no masses and bowel sounds normal  MS: no musculoskeletal defects are noted and gait is age appropriate without ataxia  SKIN: no suspicious lesions or rashes  NEURO: Normal strength and tone, sensory exam grossly normal, mentation intact and speech normal  PSYCH: mentation appears normal and affect normal/bright        ASSESSMENT/PLAN:   1. Routine general medical examination at a health care facility  Screening fasting labs ordered.  - Lipid panel reflex to direct LDL Fasting  - Glucose  - CBC with platelets    2. Moderate Depression [296.22]  Symptoms well managed.  Recommending to decrease the dose of sertraline from 150 to 100 mg daily.  Follow-up in 6 months for recheck.  - sertraline 100 MG PO tablet; TAKE 1 TABLET BY MOUTH DAILY  Dispense: 90 tablet; Refill: 1    3. Migraine with aura and without status migrainosus, not intractable  Well-controlled.  Refill on medication ordered in case she gets another bout.  - SUMAtriptan (IMITREX) 50 MG PO tablet; Take 1 tablet (50 mg) by mouth at onset of headache for migraine May repeat dose in 2 hours if needed, max 4 tabs per 24 hours  Dispense: 9 tablet; Refill: 3    COUNSELING:   Reviewed preventive health counseling, as reflected in patient instructions       Regular exercise       Healthy diet/nutrition    Estimated body mass index is 28.35 kg/m  as calculated from the following:    Height as of this encounter: 1.58 m (5' 2.21\").    Weight as of this encounter: 70.8 kg (156 lb).    Weight management plan: Discussed healthy diet and exercise guidelines     reports that she quit smoking about 31 years ago. Her smoking use included cigarettes. She has a 20.00 pack-year smoking history. She has never used smokeless tobacco.      Counseling " Resources:  ATP IV Guidelines  Pooled Cohorts Equation Calculator  Breast Cancer Risk Calculator  FRAX Risk Assessment  ICSI Preventive Guidelines  Dietary Guidelines for Americans, 2010  "[employer]s MyPlate  ASA Prophylaxis  Lung CA Screening    Aurelio Rivera MD  [facility] [place]

## 2020-02-20 ASSESSMENT — ANXIETY QUESTIONNAIRES: GAD7 TOTAL SCORE: 0

## 2020-07-29 ENCOUNTER — MYC MEDICAL ADVICE (OUTPATIENT)
Dept: FAMILY MEDICINE | Facility: CLINIC | Age: 60
End: 2020-07-29

## 2020-08-03 ASSESSMENT — PATIENT HEALTH QUESTIONNAIRE - PHQ9: SUM OF ALL RESPONSES TO PHQ QUESTIONS 1-9: 3

## 2020-08-03 NOTE — TELEPHONE ENCOUNTER
PHQ 1/21/2020 2/5/2020 2/19/2020   PHQ-9 Total Score 3 3 2   Q9: Thoughts of better off dead/self-harm past 2 weeks Not at all Not at all Not at all       Patient returned call to clinic and completed phq9 over the phone.  Patient will send in another refill request at the end of the week.    INES MoreauN, RN  [employer] Triage

## 2020-09-23 DIAGNOSIS — F32.1 MAJOR DEPRESSIVE DISORDER, SINGLE EPISODE, MODERATE (H): ICD-10-CM

## 2020-09-24 RX ORDER — SERTRALINE HYDROCHLORIDE 100 MG/1
TABLET, FILM COATED ORAL
Qty: 30 TABLET | Refills: 5 | Status: SHIPPED | OUTPATIENT
Start: 2020-09-24 | End: 2021-03-11

## 2020-09-24 NOTE — TELEPHONE ENCOUNTER
[place] [facility] [place]    Jeannine HUANG RN  EP Triage

## 2021-01-15 ENCOUNTER — HEALTH MAINTENANCE LETTER (OUTPATIENT)
Age: 61
End: 2021-01-15

## 2021-03-10 DIAGNOSIS — F32.1 MAJOR DEPRESSIVE DISORDER, SINGLE EPISODE, MODERATE (H): ICD-10-CM

## 2021-03-10 NOTE — TELEPHONE ENCOUNTER
Patient does not use MyChart. Left non-detailed message to call the clinic back at [phone] and ask to speak with a nurse.     Routing refill request to provider for review/approval because:  Patient needs to be seen because it has been more than 1 year since last office visit.  Routing also to team to assist with scheduling follow up.  Allyn Claros RN

## 2021-03-11 RX ORDER — SERTRALINE HYDROCHLORIDE 100 MG/1
TABLET, FILM COATED ORAL
Qty: 30 TABLET | Refills: 0 | Status: SHIPPED | OUTPATIENT
Start: 2021-03-11 | End: 2021-03-24

## 2021-03-12 NOTE — TELEPHONE ENCOUNTER
RF ordered. Patient needs a follow up visit.    Aurelio Rivera MD  [facility], [place]

## 2021-03-21 ENCOUNTER — HEALTH MAINTENANCE LETTER (OUTPATIENT)
Age: 61
End: 2021-03-21

## 2021-03-22 DIAGNOSIS — F51.04 CHRONIC INSOMNIA: ICD-10-CM

## 2021-03-22 DIAGNOSIS — F32.1 MAJOR DEPRESSIVE DISORDER, SINGLE EPISODE, MODERATE (H): ICD-10-CM

## 2021-03-24 RX ORDER — SERTRALINE HYDROCHLORIDE 100 MG/1
TABLET, FILM COATED ORAL
Qty: 30 TABLET | Refills: 0 | Status: SHIPPED | OUTPATIENT
Start: 2021-03-24 | End: 2021-04-12

## 2021-03-24 RX ORDER — ZOLPIDEM TARTRATE 5 MG/1
TABLET ORAL
Qty: 30 TABLET | Refills: 0 | Status: SHIPPED | OUTPATIENT
Start: 2021-03-24 | End: 2021-12-22

## 2021-03-24 NOTE — TELEPHONE ENCOUNTER
Routing refill request to provider for review/approval because:  Constanza given x1 and patient did not follow up, please advise  Patient needs to be seen because it has been more than 1 year since last office visit.  Routing also to team to assist with scheduling follow up.  Allyn Claros RN

## 2021-04-12 ENCOUNTER — OFFICE VISIT (OUTPATIENT)
Dept: FAMILY MEDICINE | Facility: CLINIC | Age: 61
End: 2021-04-12
Payer: COMMERCIAL

## 2021-04-12 VITALS
BODY MASS INDEX: 30.18 KG/M2 | WEIGHT: 164 LBS | TEMPERATURE: 97.1 F | OXYGEN SATURATION: 99 % | HEIGHT: 62 IN | SYSTOLIC BLOOD PRESSURE: 126 MMHG | DIASTOLIC BLOOD PRESSURE: 70 MMHG | HEART RATE: 58 BPM

## 2021-04-12 DIAGNOSIS — F32.1 MAJOR DEPRESSIVE DISORDER, SINGLE EPISODE, MODERATE (H): ICD-10-CM

## 2021-04-12 DIAGNOSIS — R25.2 LEG CRAMPS: Primary | ICD-10-CM

## 2021-04-12 LAB
ALBUMIN SERPL-MCNC: 3.9 G/DL (ref 3.4–5)
ALP SERPL-CCNC: 71 U/L (ref 40–150)
ALT SERPL W P-5'-P-CCNC: 28 U/L (ref 0–50)
ANION GAP SERPL CALCULATED.3IONS-SCNC: 4 MMOL/L (ref 3–14)
AST SERPL W P-5'-P-CCNC: 16 U/L (ref 0–45)
BILIRUB SERPL-MCNC: 0.2 MG/DL (ref 0.2–1.3)
BUN SERPL-MCNC: 23 MG/DL (ref 7–30)
CALCIUM SERPL-MCNC: 9.6 MG/DL (ref 8.5–10.1)
CHLORIDE SERPL-SCNC: 106 MMOL/L (ref 94–109)
CO2 SERPL-SCNC: 31 MMOL/L (ref 20–32)
CREAT SERPL-MCNC: 0.7 MG/DL (ref 0.52–1.04)
ERYTHROCYTE [DISTWIDTH] IN BLOOD BY AUTOMATED COUNT: 13.3 % (ref 10–15)
FERRITIN SERPL-MCNC: 66 NG/ML (ref 8–252)
GFR SERPL CREATININE-BSD FRML MDRD: >90 ML/MIN/{1.73_M2}
GLUCOSE SERPL-MCNC: 91 MG/DL (ref 70–99)
HCT VFR BLD AUTO: 37.6 % (ref 35–47)
HGB BLD-MCNC: 12.4 G/DL (ref 11.7–15.7)
MAGNESIUM SERPL-MCNC: 2.3 MG/DL (ref 1.6–2.3)
MCH RBC QN AUTO: 29.1 PG (ref 26.5–33)
MCHC RBC AUTO-ENTMCNC: 33 G/DL (ref 31.5–36.5)
MCV RBC AUTO: 88 FL (ref 78–100)
PHOSPHATE SERPL-MCNC: 3.4 MG/DL (ref 2.5–4.5)
PLATELET # BLD AUTO: 197 10E9/L (ref 150–450)
POTASSIUM SERPL-SCNC: 4 MMOL/L (ref 3.4–5.3)
PROT SERPL-MCNC: 7.4 G/DL (ref 6.8–8.8)
RBC # BLD AUTO: 4.26 10E12/L (ref 3.8–5.2)
SODIUM SERPL-SCNC: 141 MMOL/L (ref 133–144)
TSH SERPL DL<=0.005 MIU/L-ACNC: 1.98 MU/L (ref 0.4–4)
WBC # BLD AUTO: 5.6 10E9/L (ref 4–11)

## 2021-04-12 PROCEDURE — 99214 OFFICE O/P EST MOD 30 MIN: CPT | Mod: 25 | Performed by: FAMILY MEDICINE

## 2021-04-12 PROCEDURE — 82728 ASSAY OF FERRITIN: CPT | Performed by: FAMILY MEDICINE

## 2021-04-12 PROCEDURE — 82306 VITAMIN D 25 HYDROXY: CPT | Performed by: FAMILY MEDICINE

## 2021-04-12 PROCEDURE — 99000 SPECIMEN HANDLING OFFICE-LAB: CPT | Performed by: FAMILY MEDICINE

## 2021-04-12 PROCEDURE — 80053 COMPREHEN METABOLIC PANEL: CPT | Performed by: FAMILY MEDICINE

## 2021-04-12 PROCEDURE — 84100 ASSAY OF PHOSPHORUS: CPT | Performed by: FAMILY MEDICINE

## 2021-04-12 PROCEDURE — 83735 ASSAY OF MAGNESIUM: CPT | Performed by: FAMILY MEDICINE

## 2021-04-12 PROCEDURE — 90750 HZV VACC RECOMBINANT IM: CPT | Performed by: FAMILY MEDICINE

## 2021-04-12 PROCEDURE — 96127 BRIEF EMOTIONAL/BEHAV ASSMT: CPT | Performed by: FAMILY MEDICINE

## 2021-04-12 PROCEDURE — 84443 ASSAY THYROID STIM HORMONE: CPT | Performed by: FAMILY MEDICINE

## 2021-04-12 PROCEDURE — 84630 ASSAY OF ZINC: CPT | Mod: 90 | Performed by: FAMILY MEDICINE

## 2021-04-12 PROCEDURE — 85027 COMPLETE CBC AUTOMATED: CPT | Performed by: FAMILY MEDICINE

## 2021-04-12 PROCEDURE — 90471 IMMUNIZATION ADMIN: CPT | Performed by: FAMILY MEDICINE

## 2021-04-12 PROCEDURE — 36415 COLL VENOUS BLD VENIPUNCTURE: CPT | Performed by: FAMILY MEDICINE

## 2021-04-12 RX ORDER — SERTRALINE HYDROCHLORIDE 100 MG/1
100 TABLET, FILM COATED ORAL DAILY
Qty: 90 TABLET | Refills: 1 | Status: SHIPPED | OUTPATIENT
Start: 2021-04-12 | End: 2021-10-04

## 2021-04-12 ASSESSMENT — PATIENT HEALTH QUESTIONNAIRE - PHQ9
5. POOR APPETITE OR OVEREATING: NOT AT ALL
SUM OF ALL RESPONSES TO PHQ QUESTIONS 1-9: 5

## 2021-04-12 ASSESSMENT — ANXIETY QUESTIONNAIRES
2. NOT BEING ABLE TO STOP OR CONTROL WORRYING: SEVERAL DAYS
GAD7 TOTAL SCORE: 3
5. BEING SO RESTLESS THAT IT IS HARD TO SIT STILL: NOT AT ALL
7. FEELING AFRAID AS IF SOMETHING AWFUL MIGHT HAPPEN: SEVERAL DAYS
6. BECOMING EASILY ANNOYED OR IRRITABLE: NOT AT ALL
3. WORRYING TOO MUCH ABOUT DIFFERENT THINGS: NOT AT ALL
1. FEELING NERVOUS, ANXIOUS, OR ON EDGE: SEVERAL DAYS

## 2021-04-12 ASSESSMENT — MIFFLIN-ST. JEOR: SCORE: 1272.15

## 2021-04-12 NOTE — PROGRESS NOTES
"    Assessment & Plan     Leg cramps  Questionable etiology.  Recommending to increase hydration.  Labs ordered as below to further investigation for the cause of cramps in the legs  - CBC with platelets  - Comprehensive metabolic panel  - TSH with free T4 reflex  - Vitamin D Deficiency  - Ferritin  - Magnesium  - Zinc  - Phosphorus    Moderate Depression [296.22]  Symptoms well controlled.  Resume sertraline 100 mg daily.  - sertraline (ZOLOFT) 100 MG tablet; Take 1 tablet (100 mg) by mouth daily      BMI:   Estimated body mass index is 30 kg/m  as calculated from the following:    Height as of this encounter: 1.575 m (5' 2\").    Weight as of this encounter: 74.4 kg (164 lb).         Return in about 6 months (around 10/12/2021) for Annual Physical Exam.    Aurelio Rivera MD  [facility] is a 60 year old who presents for the following health issues     HPI   Musculoskeletal problem/cramping   Onset/Duration: ongoing for years   Description  Location: leg - bilateral left is worse   Joint Swelling: no  Pain: YES  Intensity:  severe  Progression of Symptoms:  intermittent  Accompanying signs and symptoms:   Fevers: no  Numbness/tingling/weakness: no  History  Trauma to the area: no  Previous evaluation:  no  Precipitating or alleviating factors:  Aggravating factors include: cold water, exercise   Therapies tried and outcome: magnesium, zinc     Depression and Anxiety Follow-Up    How are you doing with your depression since your last visit? Improved     How are you doing with your anxiety since your last visit?  Improved     Are you having other symptoms that might be associated with depression or anxiety? No    Have you had a significant life event? No     Do you have any concerns with your use of alcohol or other drugs? No    Social History     Tobacco Use     Smoking status: Former Smoker     Packs/day: 2.00     Years: 10.00     Pack years: 20.00     Types: Cigarettes " "    Quit date: 1989     Years since quittin.2     Smokeless tobacco: Never Used   Substance Use Topics     Alcohol use: Yes     Alcohol/week: 0.0 standard drinks     Comment: one drink per month     Drug use: No     PHQ 2020 8/3/2020 2021   PHQ-9 Total Score 2 3 5   Q9: Thoughts of better off dead/self-harm past 2 weeks Not at all Not at all Not at all     AMADOR-7 SCORE 2020   Total Score - - -   Total Score 1 0 3         Suicide Assessment Five-step Evaluation and Treatment (SAFE-T)      How many servings of fruits and vegetables do you eat daily?  2-3    On average, how many sweetened beverages do you drink each day (Examples: soda, juice, sweet tea, etc.  Do NOT count diet or artificially sweetened beverages)?   0    How many days per week do you exercise enough to make your heart beat faster? 4    How many minutes a day do you exercise enough to make your heart beat faster? 30 - 60    How many days per week do you miss taking your medication? 0          Review of Systems   CONSTITUTIONAL: NEGATIVE for fever, chills, change in weight  ENT/MOUTH: NEGATIVE for ear, mouth and throat problems  RESP: NEGATIVE for significant cough or SOB  CV: NEGATIVE for chest pain, palpitations or peripheral edema      Objective    /70   Pulse 58   Temp 97.1  F (36.2  C) (Tympanic)   Ht 1.575 m (5' 2\")   Wt 74.4 kg (164 lb)   LMP 2014   SpO2 99%   BMI 30.00 kg/m    Body mass index is 30 kg/m .  Physical Exam   GENERAL: healthy, alert and no distress  NECK: no adenopathy, no asymmetry, masses, or scars and thyroid normal to palpation  RESP: lungs clear to auscultation - no rales, rhonchi or wheezes  CV: regular rate and rhythm, normal S1 S2, no S3 or S4, no murmur, click or rub, no peripheral edema and peripheral pulses strong  MS: no gross musculoskeletal defects noted, no edema  PSYCH: mentation appears normal, affect normal/bright                "

## 2021-04-13 LAB — DEPRECATED CALCIDIOL+CALCIFEROL SERPL-MC: 41 UG/L (ref 20–75)

## 2021-04-13 ASSESSMENT — ANXIETY QUESTIONNAIRES: GAD7 TOTAL SCORE: 3

## 2021-04-14 LAB — ZINC SERPL-MCNC: 98.9 UG/DL (ref 60–120)

## 2021-09-05 ENCOUNTER — HEALTH MAINTENANCE LETTER (OUTPATIENT)
Age: 61
End: 2021-09-05

## 2021-10-30 DIAGNOSIS — F32.1 MAJOR DEPRESSIVE DISORDER, SINGLE EPISODE, MODERATE (H): ICD-10-CM

## 2021-11-01 RX ORDER — SERTRALINE HYDROCHLORIDE 100 MG/1
TABLET, FILM COATED ORAL
Qty: 30 TABLET | Refills: 0 | Status: SHIPPED | OUTPATIENT
Start: 2021-11-01 | End: 2022-01-14

## 2021-11-01 NOTE — TELEPHONE ENCOUNTER
Called and left a VM for the patient to call the clinic.  See note below.    Sherlyn Gamble on 11/1/2021 at 4:28 PM

## 2021-11-01 NOTE — TELEPHONE ENCOUNTER
Medication is being filled for 1 time refill only due to:  Patient needs to be seen because needs depression/anxiety med check..     Jeannine HUANG RN  EP Triage

## 2022-01-13 DIAGNOSIS — F32.1 MAJOR DEPRESSIVE DISORDER, SINGLE EPISODE, MODERATE (H): ICD-10-CM

## 2022-01-14 ENCOUNTER — MYC MEDICAL ADVICE (OUTPATIENT)
Dept: FAMILY MEDICINE | Facility: CLINIC | Age: 62
End: 2022-01-14
Payer: COMMERCIAL

## 2022-01-14 RX ORDER — SERTRALINE HYDROCHLORIDE 100 MG/1
TABLET, FILM COATED ORAL
Qty: 90 TABLET | Refills: 0 | Status: SHIPPED | OUTPATIENT
Start: 2022-01-14 | End: 2022-03-30

## 2022-03-27 ASSESSMENT — ENCOUNTER SYMPTOMS
HEMATURIA: 0
DIARRHEA: 0
COUGH: 0
FEVER: 0
HEARTBURN: 0
SHORTNESS OF BREATH: 0
PARESTHESIAS: 0
SORE THROAT: 0
PALPITATIONS: 0
HEMATOCHEZIA: 0
JOINT SWELLING: 0
DYSURIA: 0
MYALGIAS: 0
NAUSEA: 0
FREQUENCY: 0
CHILLS: 0
ABDOMINAL PAIN: 0
DIZZINESS: 0
HEADACHES: 0
ARTHRALGIAS: 0
WEAKNESS: 0
EYE PAIN: 0
CONSTIPATION: 0
BREAST MASS: 0
NERVOUS/ANXIOUS: 0

## 2022-03-30 ENCOUNTER — OFFICE VISIT (OUTPATIENT)
Dept: FAMILY MEDICINE | Facility: CLINIC | Age: 62
End: 2022-03-30
Payer: COMMERCIAL

## 2022-03-30 ENCOUNTER — ANCILLARY PROCEDURE (OUTPATIENT)
Dept: MAMMOGRAPHY | Facility: CLINIC | Age: 62
End: 2022-03-30
Payer: COMMERCIAL

## 2022-03-30 VITALS
RESPIRATION RATE: 16 BRPM | OXYGEN SATURATION: 100 % | HEIGHT: 62 IN | SYSTOLIC BLOOD PRESSURE: 122 MMHG | DIASTOLIC BLOOD PRESSURE: 62 MMHG | BODY MASS INDEX: 30.73 KG/M2 | WEIGHT: 167 LBS | HEART RATE: 63 BPM | TEMPERATURE: 98.1 F

## 2022-03-30 DIAGNOSIS — F32.1 MAJOR DEPRESSIVE DISORDER, SINGLE EPISODE, MODERATE (H): ICD-10-CM

## 2022-03-30 DIAGNOSIS — G43.109 MIGRAINE WITH AURA AND WITHOUT STATUS MIGRAINOSUS, NOT INTRACTABLE: ICD-10-CM

## 2022-03-30 DIAGNOSIS — Z00.00 ANNUAL PHYSICAL EXAM: Primary | ICD-10-CM

## 2022-03-30 DIAGNOSIS — R25.2 LEG CRAMPS: ICD-10-CM

## 2022-03-30 DIAGNOSIS — Z12.31 VISIT FOR SCREENING MAMMOGRAM: ICD-10-CM

## 2022-03-30 DIAGNOSIS — F51.04 CHRONIC INSOMNIA: ICD-10-CM

## 2022-03-30 PROCEDURE — 36415 COLL VENOUS BLD VENIPUNCTURE: CPT | Performed by: FAMILY MEDICINE

## 2022-03-30 PROCEDURE — 77067 SCR MAMMO BI INCL CAD: CPT | Mod: TC | Performed by: RADIOLOGY

## 2022-03-30 PROCEDURE — 80061 LIPID PANEL: CPT | Performed by: FAMILY MEDICINE

## 2022-03-30 PROCEDURE — 99396 PREV VISIT EST AGE 40-64: CPT | Performed by: FAMILY MEDICINE

## 2022-03-30 PROCEDURE — 80048 BASIC METABOLIC PNL TOTAL CA: CPT | Performed by: FAMILY MEDICINE

## 2022-03-30 PROCEDURE — 77063 BREAST TOMOSYNTHESIS BI: CPT | Mod: TC | Performed by: RADIOLOGY

## 2022-03-30 PROCEDURE — 99214 OFFICE O/P EST MOD 30 MIN: CPT | Mod: 25 | Performed by: FAMILY MEDICINE

## 2022-03-30 RX ORDER — SUMATRIPTAN 50 MG/1
50 TABLET, FILM COATED ORAL
Qty: 9 TABLET | Refills: 3 | Status: SHIPPED | OUTPATIENT
Start: 2022-03-30 | End: 2023-06-27

## 2022-03-30 RX ORDER — ZOLPIDEM TARTRATE 5 MG/1
TABLET ORAL
Qty: 30 TABLET | Refills: 1
Start: 2022-03-30 | End: 2022-09-28

## 2022-03-30 RX ORDER — ZOLPIDEM TARTRATE 5 MG/1
TABLET ORAL
Qty: 30 TABLET | Refills: 1 | Status: SHIPPED | OUTPATIENT
Start: 2022-03-30 | End: 2022-03-30

## 2022-03-30 RX ORDER — SERTRALINE HYDROCHLORIDE 100 MG/1
100 TABLET, FILM COATED ORAL DAILY
Qty: 90 TABLET | Refills: 1 | Status: SHIPPED | OUTPATIENT
Start: 2022-03-30 | End: 2022-09-16

## 2022-03-30 ASSESSMENT — ENCOUNTER SYMPTOMS
FEVER: 0
SORE THROAT: 0
MYALGIAS: 0
DYSURIA: 0
SHORTNESS OF BREATH: 0
HEMATOCHEZIA: 0
HEADACHES: 0
HEARTBURN: 0
CHILLS: 0
FREQUENCY: 0
HEMATURIA: 0
ARTHRALGIAS: 0
WEAKNESS: 0
JOINT SWELLING: 0
PARESTHESIAS: 0
PALPITATIONS: 0
DIARRHEA: 0
DIZZINESS: 0
BREAST MASS: 0
NAUSEA: 0
EYE PAIN: 0
ABDOMINAL PAIN: 0
COUGH: 0
NERVOUS/ANXIOUS: 0
CONSTIPATION: 0

## 2022-03-30 ASSESSMENT — PATIENT HEALTH QUESTIONNAIRE - PHQ9
10. IF YOU CHECKED OFF ANY PROBLEMS, HOW DIFFICULT HAVE THESE PROBLEMS MADE IT FOR YOU TO DO YOUR WORK, TAKE CARE OF THINGS AT HOME, OR GET ALONG WITH OTHER PEOPLE: NOT DIFFICULT AT ALL
SUM OF ALL RESPONSES TO PHQ QUESTIONS 1-9: 4
SUM OF ALL RESPONSES TO PHQ QUESTIONS 1-9: 4

## 2022-03-30 ASSESSMENT — PAIN SCALES - GENERAL: PAINLEVEL: NO PAIN (0)

## 2022-03-30 NOTE — PROGRESS NOTES
SUBJECTIVE:   CC: Allyn Thurman is an 61 year old woman who presents for preventive health visit.       Patient has been advised of split billing requirements and indicates understanding: Yes  Healthy Habits:     Getting at least 3 servings of Calcium per day:  Yes    Bi-annual eye exam:  NO    Dental care twice a year:  NO    Sleep apnea or symptoms of sleep apnea:  Daytime drowsiness    Diet:  Regular (no restrictions)    Frequency of exercise:  4-5 days/week    Duration of exercise:  30-45 minutes    Taking medications regularly:  Yes    Medication side effects:  None    PHQ-2 Total Score: 0    Additional concerns today:  Yes    Depression Followup    How are you doing with your depression since your last visit? No change    Are you having other symptoms that might be associated with depression? No    Have you had a significant life event?  No     Are you feeling anxious or having panic attacks?   No    Do you have any concerns with your use of alcohol or other drugs? No    Social History     Tobacco Use     Smoking status: Former Smoker     Packs/day: 2.00     Years: 10.00     Pack years: 20.00     Types: Cigarettes     Quit date: 1989     Years since quittin.2     Smokeless tobacco: Never Used   Substance Use Topics     Alcohol use: Yes     Alcohol/week: 0.0 standard drinks     Comment: one drink per month     Drug use: No     PHQ 2021 10/5/2021 3/30/2022   PHQ-9 Total Score 5 2 4   Q9: Thoughts of better off dead/self-harm past 2 weeks Not at all Not at all Not at all     AMADOR-7 SCORE 2020   Total Score - - -   Total Score 1 0 3         Suicide Assessment Five-step Evaluation and Treatment (SAFE-T)    Migraine     Since your last clinic visit, how have your headaches changed?  Improved    How often are you getting headaches or migraines? none     Are you able to do normal daily activities when you have a migraine? Yes    Are you taking rescue/relief medications?  (Select all that apply) sumatriptan (Imitrex)    How helpful is your rescue/relief medication?  I get total relief    Are you taking any medications to prevent migraines? (Select all that apply)  No    In the past 4 weeks, how often have you gone to urgent care or the emergency room because of your headaches?  0  Patient complains of leg cramps off-and-on bilaterally.  Symptomatic test happened in the toes.  Last for a few minutes and then resolve.  Affected area becomes sore for a little while.  Wonders why that happens.    Today's PHQ-2 Score:   PHQ-2 (  [employer]) 3/27/2022   Q1: Little interest or pleasure in doing things 0   Q2: Feeling down, depressed or hopeless 0   PHQ-2 Score 0   PHQ-2 Total Score (12-17 Years)- Positive if 3 or more points; Administer PHQ-A if positive -   Q1: Little interest or pleasure in doing things Not at all   Q2: Feeling down, depressed or hopeless Not at all   PHQ-2 Score 0       Abuse: Current or Past (Physical, Sexual or Emotional) - No  Do you feel safe in your environment? Yes    Have you ever done Advance Care Planning? (For example, a Health Directive, POLST, or a discussion with a medical provider or your loved ones about your wishes): No, advance care planning information given to patient to review.  Patient declined advance care planning discussion at this time.    Social History     Tobacco Use     Smoking status: Former Smoker     Packs/day: 2.00     Years: 10.00     Pack years: 20.00     Types: Cigarettes     Quit date: 1989     Years since quittin.2     Smokeless tobacco: Never Used   Substance Use Topics     Alcohol use: Yes     Alcohol/week: 0.0 standard drinks     Comment: one drink per month     If you drink alcohol do you typically have >3 drinks per day or >7 drinks per week? No    No flowsheet data found.  Reviewed orders with patient.  Reviewed health maintenance and updated orders accordingly - Yes  Patient Active Problem List   Diagnosis     Vitamin  D deficiency     Moderate Depression [296.22]     Chronic insomnia     Chronic rhinitis     Migraine with aura and without status migrainosus, not intractable     Advanced directives, counseling/discussion     Past Surgical History:   Procedure Laterality Date     COLONOSCOPY  2008    normal, repeat 10 years     COLONOSCOPY N/A 2019    Procedure: COMBINED COLONOSCOPY, SINGLE OR MULTIPLE BIOPSY/POLYPECTOMY BY BIOPSY;  Surgeon: Ford Montemayor MD;  Location:  GI     HC SLEEP STUDY; ATTENDED  2009    AHI 0.7, RDI 4.4, max desat FIO2 90%, poor sleep architecture first 4 hours      HC STAB INCISIONS PHLEBECT VARI VEINS 1 EXT, 10-20 INC      bilateral     LASIK       TONSILLECTOMY  1967     ZZC ECHO HEART XTHORACIC,COMPLETE, W/O DOPPLER  2008    normal     ZZC TMJ RECONSTRUCTION  1985    right       Social History     Tobacco Use     Smoking status: Former Smoker     Packs/day: 2.00     Years: 10.00     Pack years: 20.00     Types: Cigarettes     Quit date: 1989     Years since quittin.2     Smokeless tobacco: Never Used   Substance Use Topics     Alcohol use: Yes     Alcohol/week: 0.0 standard drinks     Comment: one drink per month     Family History   Problem Relation Age of Onset     Alcohol/Drug Mother      Depression Mother         attempted suicide age 76     Thyroid Disease Mother         hypothyroidism     Hypertension Mother         CHF, Alcoholism     Anxiety Disorder Mother      Mental Illness Mother         Social phobia     Substance Abuse Mother         Alcoholism     Diabetes Father         CHF, Heart valve replacement,  Hypertension     Eye Disorder Father         glaucoma, herpetic keratitis     Hypertension Father      Diabetes Paternal Uncle         type 2     Depression Brother         Suicide     Asthma Brother      Respiratory Brother         sleep apnea     Respiratory Brother         asthma     Thyroid Disease Other         Diagnosed with Papillary Thyroid  Carcinoma         Current Outpatient Medications   Medication Sig Dispense Refill     cetirizine (ZYRTEC) 10 MG tablet Take 1 tablet (10 mg) by mouth every morning 30 tablet 1     Multiple Vitamins-Minerals (CENTRUM) TABS Take 1 tablet by mouth daily.       sertraline (ZOLOFT) 100 MG tablet Take 1 tablet (100 mg) by mouth daily 90 tablet 1     SUMAtriptan (IMITREX) 50 MG tablet Take 1 tablet (50 mg) by mouth at onset of headache for migraine May repeat dose in 2 hours if needed, max 4 tabs per 24 hours 9 tablet 3     zolpidem (AMBIEN) 5 MG tablet TAKE 1 TABLET BY MOUTH EVERYDAY AT BEDTIME 30 tablet 1     No Known Allergies    Breast Cancer Screening:    Breast CA Risk Assessment (FHS-7) 3/27/2022 3/30/2022   Do you have a family history of breast, colon, or ovarian cancer? No / Unknown No / Unknown         Mammogram Screening: Recommended mammography every 1-2 years with patient discussion and risk factor consideration  Pertinent mammograms are reviewed under the imaging tab.    History of abnormal Pap smear: NO - age 30-65 PAP every 5 years with negative HPV co-testing recommended  PAP / HPV Latest Ref Rng & Units 11/6/2018 1/20/2016 8/17/2012   PAP (Historical) - NIL NIL NIL   HPV16 NEG:Negative Negative Negative -   HPV18 NEG:Negative Negative Negative -   HRHPV NEG:Negative Negative Negative -     Reviewed and updated as needed this visit by clinical staff   Tobacco  Allergies  Meds   Med Hx  Surg Hx  Fam Hx  Soc Hx        Reviewed and updated as needed this visit by Provider                     Review of Systems   Constitutional: Negative for chills and fever.   HENT: Positive for congestion. Negative for ear pain, hearing loss and sore throat.    Eyes: Negative for pain and visual disturbance.   Respiratory: Negative for cough and shortness of breath.    Cardiovascular: Negative for chest pain, palpitations and peripheral edema.   Gastrointestinal: Negative for abdominal pain, constipation, diarrhea,  "heartburn, hematochezia and nausea.   Breasts:  Negative for tenderness, breast mass and discharge.   Genitourinary: Negative for dysuria, frequency, genital sores, hematuria, pelvic pain, urgency, vaginal bleeding and vaginal discharge.   Musculoskeletal: Negative for arthralgias, joint swelling and myalgias.   Skin: Positive for rash.   Neurological: Negative for dizziness, weakness, headaches and paresthesias.   Psychiatric/Behavioral: Negative for mood changes. The patient is not nervous/anxious.           OBJECTIVE:   /62   Pulse 63   Temp 98.1  F (36.7  C) (Temporal)   Resp 16   Ht 1.575 m (5' 2\")   Wt 75.8 kg (167 lb)   LMP 01/03/2014   SpO2 100%   BMI 30.54 kg/m    Physical Exam  GENERAL: healthy, alert and no distress  EYES: Eyes grossly normal to inspection, PERRL and conjunctivae and sclerae normal  HENT: ear canals and TM's normal, nose and mouth without ulcers or lesions  NECK: no adenopathy, no asymmetry, masses, or scars and thyroid normal to palpation  RESP: lungs clear to auscultation - no rales, rhonchi or wheezes  BREAST: normal without masses, tenderness or nipple discharge and no palpable axillary masses or adenopathy  CV: regular rate and rhythm, normal S1 S2, no S3 or S4, no murmur, click or rub, no peripheral edema and peripheral pulses strong  ABDOMEN: soft, nontender, no hepatosplenomegaly, no masses and bowel sounds normal  MS: no gross musculoskeletal defects noted, no edema  SKIN: no suspicious lesions or rashes  NEURO: Normal strength and tone, mentation intact and speech normal  PSYCH: mentation appears normal, affect normal/bright        ASSESSMENT/PLAN:   1. Annual physical exam    - Lipid panel reflex to direct LDL Fasting; Future  - Lipid panel reflex to direct LDL Fasting    2. Major depressive disorder, single episode, moderate (H)  Symptoms well controlled.  Patient would like to resume the same medication dose.  - sertraline (ZOLOFT) 100 MG tablet; Take 1 tablet " "(100 mg) by mouth daily  Dispense: 90 tablet; Refill: 1    3. Migraine with aura and without status migrainosus, not intractable  Patient rarely gets any headache now.  Refill medication ordered as needed  - SUMAtriptan (IMITREX) 50 MG tablet; Take 1 tablet (50 mg) by mouth at onset of headache for migraine May repeat dose in 2 hours if needed, max 4 tabs per 24 hours  Dispense: 9 tablet; Refill: 3    4. Chronic insomnia  Patient been using half to 1 tablet of Ambien as needed  - zolpidem (AMBIEN) 5 MG tablet; TAKE 1 TABLET BY MOUTH EVERYDAY AT BEDTIME  Dispense: 30 tablet; Refill: 1    5. Leg cramps  Patient been experiencing leg cramps off and on.  She is instructed to increase hydration.  Eat foods that are rich in potassium.  BMP ordered    - Basic metabolic panel; Future  - Basic metabolic panel          COUNSELING:  Reviewed preventive health counseling, as reflected in patient instructions       Regular exercise       Healthy diet/nutrition    Estimated body mass index is 30.54 kg/m  as calculated from the following:    Height as of this encounter: 1.575 m (5' 2\").    Weight as of this encounter: 75.8 kg (167 lb).    Weight management plan: Discussed healthy diet and exercise guidelines    She reports that she quit smoking about 33 years ago. Her smoking use included cigarettes. She has a 20.00 pack-year smoking history. She has never used smokeless tobacco.      Counseling Resources:  ATP IV Guidelines  Pooled Cohorts Equation Calculator  Breast Cancer Risk Calculator  BRCA-Related Cancer Risk Assessment: FHS-7 Tool  FRAX Risk Assessment  ICSI Preventive Guidelines  Dietary Guidelines for Americans, 2010  USDA's MyPlate  ASA Prophylaxis  Lung CA Screening    Aurelio Rivera MD  [facility] [place]  "

## 2022-03-31 LAB
ANION GAP SERPL CALCULATED.3IONS-SCNC: 4 MMOL/L (ref 3–14)
BUN SERPL-MCNC: 16 MG/DL (ref 7–30)
CALCIUM SERPL-MCNC: 9.7 MG/DL (ref 8.5–10.1)
CHLORIDE BLD-SCNC: 108 MMOL/L (ref 94–109)
CHOLEST SERPL-MCNC: 197 MG/DL
CO2 SERPL-SCNC: 26 MMOL/L (ref 20–32)
CREAT SERPL-MCNC: 0.71 MG/DL (ref 0.52–1.04)
FASTING STATUS PATIENT QL REPORTED: YES
GFR SERPL CREATININE-BSD FRML MDRD: >90 ML/MIN/1.73M2
GLUCOSE BLD-MCNC: 89 MG/DL (ref 70–99)
HDLC SERPL-MCNC: 73 MG/DL
LDLC SERPL CALC-MCNC: 113 MG/DL
NONHDLC SERPL-MCNC: 124 MG/DL
POTASSIUM BLD-SCNC: 5 MMOL/L (ref 3.4–5.3)
SODIUM SERPL-SCNC: 138 MMOL/L (ref 133–144)
TRIGL SERPL-MCNC: 57 MG/DL

## 2022-03-31 ASSESSMENT — PATIENT HEALTH QUESTIONNAIRE - PHQ9: SUM OF ALL RESPONSES TO PHQ QUESTIONS 1-9: 4

## 2022-06-09 ENCOUNTER — E-VISIT (OUTPATIENT)
Dept: FAMILY MEDICINE | Facility: CLINIC | Age: 62
End: 2022-06-09
Payer: COMMERCIAL

## 2022-06-09 DIAGNOSIS — R21 RASH: Primary | ICD-10-CM

## 2022-06-09 PROCEDURE — 99421 OL DIG E/M SVC 5-10 MIN: CPT | Performed by: FAMILY MEDICINE

## 2022-06-10 RX ORDER — TRIAMCINOLONE ACETONIDE 1 MG/G
CREAM TOPICAL 2 TIMES DAILY
Qty: 30 G | Refills: 0 | Status: SHIPPED | OUTPATIENT
Start: 2022-06-10 | End: 2022-10-11

## 2022-09-14 DIAGNOSIS — F32.1 MAJOR DEPRESSIVE DISORDER, SINGLE EPISODE, MODERATE (H): ICD-10-CM

## 2022-09-16 RX ORDER — SERTRALINE HYDROCHLORIDE 100 MG/1
TABLET, FILM COATED ORAL
Qty: 90 TABLET | Refills: 0 | Status: SHIPPED | OUTPATIENT
Start: 2022-09-16 | End: 2022-09-28

## 2022-09-16 NOTE — TELEPHONE ENCOUNTER
Prescription approved per [facility] Refill Protocol.  Nola Castañeda RN  [facility]

## 2022-09-20 ENCOUNTER — E-VISIT (OUTPATIENT)
Dept: FAMILY MEDICINE | Facility: CLINIC | Age: 62
End: 2022-09-20
Payer: COMMERCIAL

## 2022-09-20 DIAGNOSIS — L20.9 ATOPIC DERMATITIS, UNSPECIFIED TYPE: Primary | ICD-10-CM

## 2022-09-20 PROCEDURE — 99421 OL DIG E/M SVC 5-10 MIN: CPT | Performed by: FAMILY MEDICINE

## 2022-09-20 RX ORDER — PREDNISONE 20 MG/1
20 TABLET ORAL DAILY
Qty: 5 TABLET | Refills: 0 | Status: SHIPPED | OUTPATIENT
Start: 2022-09-20 | End: 2022-09-25

## 2022-09-27 ASSESSMENT — PATIENT HEALTH QUESTIONNAIRE - PHQ9
10. IF YOU CHECKED OFF ANY PROBLEMS, HOW DIFFICULT HAVE THESE PROBLEMS MADE IT FOR YOU TO DO YOUR WORK, TAKE CARE OF THINGS AT HOME, OR GET ALONG WITH OTHER PEOPLE: NOT DIFFICULT AT ALL
SUM OF ALL RESPONSES TO PHQ QUESTIONS 1-9: 1
SUM OF ALL RESPONSES TO PHQ QUESTIONS 1-9: 1

## 2022-09-28 ENCOUNTER — VIRTUAL VISIT (OUTPATIENT)
Dept: FAMILY MEDICINE | Facility: CLINIC | Age: 62
End: 2022-09-28
Payer: COMMERCIAL

## 2022-09-28 DIAGNOSIS — F51.04 CHRONIC INSOMNIA: ICD-10-CM

## 2022-09-28 DIAGNOSIS — F32.1 MAJOR DEPRESSIVE DISORDER, SINGLE EPISODE, MODERATE (H): ICD-10-CM

## 2022-09-28 PROCEDURE — 99213 OFFICE O/P EST LOW 20 MIN: CPT | Mod: 95 | Performed by: FAMILY MEDICINE

## 2022-09-28 RX ORDER — ZOLPIDEM TARTRATE 5 MG/1
TABLET ORAL
Qty: 30 TABLET | Refills: 1 | Status: SHIPPED | OUTPATIENT
Start: 2022-09-28 | End: 2023-06-27

## 2022-09-28 RX ORDER — SERTRALINE HYDROCHLORIDE 100 MG/1
50 TABLET, FILM COATED ORAL DAILY
Qty: 90 TABLET | Refills: 0 | Status: SHIPPED | OUTPATIENT
Start: 2022-09-28 | End: 2023-01-04

## 2022-09-28 ASSESSMENT — PATIENT HEALTH QUESTIONNAIRE - PHQ9
10. IF YOU CHECKED OFF ANY PROBLEMS, HOW DIFFICULT HAVE THESE PROBLEMS MADE IT FOR YOU TO DO YOUR WORK, TAKE CARE OF THINGS AT HOME, OR GET ALONG WITH OTHER PEOPLE: NOT DIFFICULT AT ALL
SUM OF ALL RESPONSES TO PHQ QUESTIONS 1-9: 1

## 2022-09-28 NOTE — PROGRESS NOTES
"Allyn is a 61 year old who is being evaluated via a billable video visit.      How would you like to obtain your AVS? MyChart  If the video visit is dropped, the invitation should be resent by: Text to cell phone: [phone]  Will anyone else be joining your video visit? No        Assessment & Plan     Major depressive disorder, single episode, moderate (H)  Well-controlled.  Recommending to lower the dose of sertraline from 100 to 50 mg daily.  If over the next 3 to 4 weeks she notices worsening of symptoms, he may go back to the 100 mg dose.  She is instructed to notify me in that case.  Otherwise I will see her back in 6 months  - sertraline (ZOLOFT) 100 MG tablet; Take 0.5 tablets (50 mg) by mouth daily    Chronic insomnia  Refill on Ambien ordered for as needed use  - zolpidem (AMBIEN) 5 MG tablet; TAKE 1 TABLET BY MOUTH EVERYDAY AT BEDTIME PRN insomnia             BMI:   Estimated body mass index is 30.54 kg/m  as calculated from the following:    Height as of 3/30/22: 1.575 m (5' 2\").    Weight as of 3/30/22: 75.8 kg (167 lb).           Return in about 6 months (around 3/28/2023) for Annual Physical Exam.    Aurelio Rivera MD  [facility]   Allyn is a 61 year old, presenting for the following health issues:  Recheck Medication      History of Present Illness       Mental Health Follow-up:  Patient presents to follow-up on Depression.Patient's depression since last visit has been:  Good  The patient is not having other symptoms associated with depression.      Any significant life events: No  Patient is not feeling anxious or having panic attacks.  Patient has no concerns about alcohol or drug use.    She eats 4 or more servings of fruits and vegetables daily.She consumes 0 sweetened beverage(s) daily.She exercises with enough effort to increase her heart rate 20 to 29 minutes per day.  She exercises with enough effort to increase her heart rate 3 or less days per week. "     Today's PHQ-9         PHQ-9 Total Score: 1    PHQ-9 Q9 Thoughts of better off dead/self-harm past 2 weeks :   Not at all    How difficult have these problems made it for you to do your work, take care of things at home, or get along with other people: Not difficult at all             Review of Systems   CONSTITUTIONAL: NEGATIVE for fever, chills, change in weight  ENT/MOUTH: NEGATIVE for ear, mouth and throat problems  RESP: NEGATIVE for significant cough or SOB  CV: NEGATIVE for chest pain, palpitations or peripheral edema      Objective           Vitals:  No vitals were obtained today due to virtual visit.    Physical Exam   GENERAL: Healthy, alert and no distress  EYES: Eyes grossly normal to inspection.  No discharge or erythema, or obvious scleral/conjunctival abnormalities.  RESP: No audible wheeze, cough, or visible cyanosis.  No visible retractions or increased work of breathing.    SKIN: Visible skin clear. No significant rash, abnormal pigmentation or lesions.  NEURO: Cranial nerves grossly intact.  Mentation and speech appropriate for age.  PSYCH: Mentation appears normal, affect normal/bright, judgement and insight intact, normal speech and appearance well-groomed.                Video-Visit Details    Video Start Time: 1:20 PM    Type of service:  Video Visit    Video End Time:1:30 PM    Originating Location (pt. Location): Home    Distant Location (provider location):  [facility]     Platform used for Video Visit: [employer]

## 2022-10-08 DIAGNOSIS — R21 RASH: ICD-10-CM

## 2022-10-11 RX ORDER — TRIAMCINOLONE ACETONIDE 1 MG/G
CREAM TOPICAL
Qty: 30 G | Refills: 0 | Status: SHIPPED | OUTPATIENT
Start: 2022-10-11 | End: 2023-02-20

## 2022-10-22 ENCOUNTER — HEALTH MAINTENANCE LETTER (OUTPATIENT)
Age: 62
End: 2022-10-22

## 2022-11-22 ENCOUNTER — E-VISIT (OUTPATIENT)
Dept: FAMILY MEDICINE | Facility: CLINIC | Age: 62
End: 2022-11-22
Payer: COMMERCIAL

## 2022-11-22 DIAGNOSIS — J01.90 ACUTE RHINOSINUSITIS: Primary | ICD-10-CM

## 2022-11-22 PROCEDURE — 99421 OL DIG E/M SVC 5-10 MIN: CPT | Performed by: FAMILY MEDICINE

## 2022-11-22 RX ORDER — AZITHROMYCIN 250 MG/1
TABLET, FILM COATED ORAL
Qty: 6 TABLET | Refills: 0 | Status: SHIPPED | OUTPATIENT
Start: 2022-11-22 | End: 2023-05-08

## 2022-12-31 DIAGNOSIS — F32.1 MAJOR DEPRESSIVE DISORDER, SINGLE EPISODE, MODERATE (H): ICD-10-CM

## 2023-01-04 RX ORDER — SERTRALINE HYDROCHLORIDE 100 MG/1
TABLET, FILM COATED ORAL
Qty: 30 TABLET | Refills: 2 | Status: SHIPPED | OUTPATIENT
Start: 2023-01-04 | End: 2023-02-27

## 2023-02-25 DIAGNOSIS — F32.1 MAJOR DEPRESSIVE DISORDER, SINGLE EPISODE, MODERATE (H): ICD-10-CM

## 2023-02-27 RX ORDER — SERTRALINE HYDROCHLORIDE 100 MG/1
TABLET, FILM COATED ORAL
Qty: 90 TABLET | Refills: 0 | Status: SHIPPED | OUTPATIENT
Start: 2023-02-27 | End: 2023-05-22

## 2023-04-13 ENCOUNTER — E-VISIT (OUTPATIENT)
Dept: FAMILY MEDICINE | Facility: CLINIC | Age: 63
End: 2023-04-13
Payer: COMMERCIAL

## 2023-04-13 DIAGNOSIS — R30.0 DYSURIA: Primary | ICD-10-CM

## 2023-04-13 PROCEDURE — 99207 PR NON-BILLABLE SERV PER CHARTING: CPT | Performed by: FAMILY MEDICINE

## 2023-04-17 ENCOUNTER — LAB (OUTPATIENT)
Dept: LAB | Facility: CLINIC | Age: 63
End: 2023-04-17
Payer: COMMERCIAL

## 2023-04-17 ENCOUNTER — E-VISIT (OUTPATIENT)
Dept: FAMILY MEDICINE | Facility: CLINIC | Age: 63
End: 2023-04-17
Payer: COMMERCIAL

## 2023-04-17 DIAGNOSIS — R30.0 DYSURIA: ICD-10-CM

## 2023-04-17 DIAGNOSIS — N30.00 ACUTE CYSTITIS WITHOUT HEMATURIA: ICD-10-CM

## 2023-04-17 DIAGNOSIS — N89.8 VAGINAL DISCHARGE: Primary | ICD-10-CM

## 2023-04-17 LAB
ALBUMIN UR-MCNC: NEGATIVE MG/DL
APPEARANCE UR: CLEAR
BACTERIA #/AREA URNS HPF: ABNORMAL /HPF
BILIRUB UR QL STRIP: NEGATIVE
CLUE CELLS: ABNORMAL
COLOR UR AUTO: YELLOW
GLUCOSE UR STRIP-MCNC: NEGATIVE MG/DL
HGB UR QL STRIP: NEGATIVE
KETONES UR STRIP-MCNC: NEGATIVE MG/DL
LEUKOCYTE ESTERASE UR QL STRIP: ABNORMAL
NITRATE UR QL: NEGATIVE
PH UR STRIP: 6.5 [PH] (ref 5–7)
RBC #/AREA URNS AUTO: ABNORMAL /HPF
SP GR UR STRIP: 1.01 (ref 1–1.03)
SQUAMOUS #/AREA URNS AUTO: ABNORMAL /LPF
TRICHOMONAS, WET PREP: ABNORMAL
UROBILINOGEN UR STRIP-ACNC: 0.2 E.U./DL
WBC #/AREA URNS AUTO: ABNORMAL /HPF
WBC'S/HIGH POWER FIELD, WET PREP: ABNORMAL
YEAST, WET PREP: ABNORMAL

## 2023-04-17 PROCEDURE — 87086 URINE CULTURE/COLONY COUNT: CPT

## 2023-04-17 PROCEDURE — 81001 URINALYSIS AUTO W/SCOPE: CPT

## 2023-04-17 PROCEDURE — 87210 SMEAR WET MOUNT SALINE/INK: CPT | Performed by: FAMILY MEDICINE

## 2023-04-17 PROCEDURE — 99421 OL DIG E/M SVC 5-10 MIN: CPT | Performed by: FAMILY MEDICINE

## 2023-04-17 PROCEDURE — 87186 SC STD MICRODIL/AGAR DIL: CPT

## 2023-04-17 PROCEDURE — 87088 URINE BACTERIA CULTURE: CPT

## 2023-04-18 RX ORDER — SULFAMETHOXAZOLE/TRIMETHOPRIM 800-160 MG
1 TABLET ORAL 2 TIMES DAILY
Qty: 10 TABLET | Refills: 0 | Status: SHIPPED | OUTPATIENT
Start: 2023-04-18 | End: 2023-04-23

## 2023-04-20 ENCOUNTER — PATIENT OUTREACH (OUTPATIENT)
Dept: CARE COORDINATION | Facility: CLINIC | Age: 63
End: 2023-04-20
Payer: COMMERCIAL

## 2023-04-21 LAB
BACTERIA UR CULT: ABNORMAL
BACTERIA UR CULT: ABNORMAL

## 2023-05-04 ENCOUNTER — E-VISIT (OUTPATIENT)
Dept: URGENT CARE | Facility: CLINIC | Age: 63
End: 2023-05-04
Payer: COMMERCIAL

## 2023-05-04 DIAGNOSIS — N89.8 VAGINAL DISCHARGE: Primary | ICD-10-CM

## 2023-05-04 PROCEDURE — 99207 PR NON-BILLABLE SERV PER CHARTING: CPT | Performed by: NURSE PRACTITIONER

## 2023-05-05 NOTE — PATIENT INSTRUCTIONS
Dear Allyn Thurman,    We are sorry you are not feeling well. Based on the responses you provided, it is recommended that you be seen in-person in urgent care so we can better evaluate your symptoms. Please click here to find the nearest urgent care location to you.   You will not be charged for this Visit. Thank you for trusting us with your care.    ARACELI Ruelas CNP

## 2023-05-08 ENCOUNTER — MYC MEDICAL ADVICE (OUTPATIENT)
Dept: FAMILY MEDICINE | Facility: CLINIC | Age: 63
End: 2023-05-08

## 2023-05-08 ENCOUNTER — OFFICE VISIT (OUTPATIENT)
Dept: FAMILY MEDICINE | Facility: CLINIC | Age: 63
End: 2023-05-08
Payer: COMMERCIAL

## 2023-05-08 VITALS
HEIGHT: 62 IN | SYSTOLIC BLOOD PRESSURE: 132 MMHG | RESPIRATION RATE: 18 BRPM | BODY MASS INDEX: 32.57 KG/M2 | HEART RATE: 51 BPM | TEMPERATURE: 98.4 F | WEIGHT: 177 LBS | OXYGEN SATURATION: 96 % | DIASTOLIC BLOOD PRESSURE: 70 MMHG

## 2023-05-08 DIAGNOSIS — N89.8 VAGINAL DISCHARGE: ICD-10-CM

## 2023-05-08 DIAGNOSIS — Z87.440 RECENT URINARY TRACT INFECTION: Primary | ICD-10-CM

## 2023-05-08 LAB
ALBUMIN UR-MCNC: NEGATIVE MG/DL
APPEARANCE UR: CLEAR
BACTERIA #/AREA URNS HPF: ABNORMAL /HPF
BILIRUB UR QL STRIP: NEGATIVE
CLUE CELLS: ABNORMAL
COLOR UR AUTO: YELLOW
GLUCOSE UR STRIP-MCNC: NEGATIVE MG/DL
HGB UR QL STRIP: NEGATIVE
KETONES UR STRIP-MCNC: NEGATIVE MG/DL
LEUKOCYTE ESTERASE UR QL STRIP: NEGATIVE
NITRATE UR QL: NEGATIVE
PH UR STRIP: 7 [PH] (ref 5–7)
RBC #/AREA URNS AUTO: ABNORMAL /HPF
SP GR UR STRIP: 1.01 (ref 1–1.03)
SQUAMOUS #/AREA URNS AUTO: ABNORMAL /LPF
TRICHOMONAS, WET PREP: ABNORMAL
UROBILINOGEN UR STRIP-ACNC: 0.2 E.U./DL
WBC #/AREA URNS AUTO: ABNORMAL /HPF
WBC'S/HIGH POWER FIELD, WET PREP: ABNORMAL
YEAST, WET PREP: ABNORMAL

## 2023-05-08 PROCEDURE — 87591 N.GONORRHOEAE DNA AMP PROB: CPT | Performed by: PHYSICIAN ASSISTANT

## 2023-05-08 PROCEDURE — 87491 CHLMYD TRACH DNA AMP PROBE: CPT | Performed by: PHYSICIAN ASSISTANT

## 2023-05-08 PROCEDURE — 99213 OFFICE O/P EST LOW 20 MIN: CPT | Performed by: PHYSICIAN ASSISTANT

## 2023-05-08 PROCEDURE — 81001 URINALYSIS AUTO W/SCOPE: CPT | Performed by: PHYSICIAN ASSISTANT

## 2023-05-08 PROCEDURE — 87086 URINE CULTURE/COLONY COUNT: CPT | Performed by: PHYSICIAN ASSISTANT

## 2023-05-08 PROCEDURE — 87210 SMEAR WET MOUNT SALINE/INK: CPT | Performed by: PHYSICIAN ASSISTANT

## 2023-05-08 ASSESSMENT — PATIENT HEALTH QUESTIONNAIRE - PHQ9
10. IF YOU CHECKED OFF ANY PROBLEMS, HOW DIFFICULT HAVE THESE PROBLEMS MADE IT FOR YOU TO DO YOUR WORK, TAKE CARE OF THINGS AT HOME, OR GET ALONG WITH OTHER PEOPLE: NOT DIFFICULT AT ALL
SUM OF ALL RESPONSES TO PHQ QUESTIONS 1-9: 2
SUM OF ALL RESPONSES TO PHQ QUESTIONS 1-9: 2

## 2023-05-08 NOTE — PROGRESS NOTES
Assessment & Plan     Recent urinary tract infection  Will ensure resolution of previously treated UA. No urinary symptoms currently.   - UA with Microscopic reflex to Culture - lab collect  - Urine Culture Aerobic Bacterial - lab collect    Vaginal discharge  Symptoms improving since onset last week. Recheck wet prep. Check GC although low risk - monogamous with .   - Wet prep - Clinic Collect  - NEISSERIA GONORRHOEA PCR  - CHLAMYDIA TRACHOMATIS PCR    Await results, will send via [employer].   In the meantime, recommended: plenty of fluids, urinate within 10 minutes of intercourse, cotton undergarments, avoid soap in vaginal area.     [facility] is a 62 year old, presenting for the following health issues:  Vaginal Problem        5/8/2023    12:50 PM   Additional Questions   Roomed by Jovana Liu MA   Accompanied by self     Vaginal Problem     History of Present Illness       Reason for visit:  Recurrent abnormal yellow vaginal discharge  Symptom onset:  3-7 days ago  Symptoms include:  Increased vaginal yellow tint discharge, had 3 wks ago also  Symptom intensity:  Mild  Symptom progression:  Improving  Had these symptoms before:  Yes  Has tried/received treatment for these symptoms:  No  What makes it worse:  No  What makes it better:  No    She eats 4 or more servings of fruits and vegetables daily.She consumes 1 sweetened beverage(s) daily.She exercises with enough effort to increase her heart rate 10 to 19 minutes per day.  She exercises with enough effort to increase her heart rate 3 or less days per week.   She is taking medications regularly.    Today's PHQ-9         PHQ-9 Total Score: 2    PHQ-9 Q9 Thoughts of better off dead/self-harm past 2 weeks :   Not at all    How difficult have these problems made it for you to do your work, take care of things at home, or get along with other people: Not difficult at all       Vaginal  "Symptoms  Onset/Duration: Re ocurring- has had two e visits for this already. Vaginal symptoms restarted Friday x 5 days ago  Description:  Vaginal Discharge: yellow   Itching (Pruritis): No  Burning sensation:  No  Odor: No  Accompanying Signs & Symptoms:  Urinary symptoms: YES- some pain but not alot  Abdominal pain: YES  Fever: No  History:   Sexually active: YES  New Partner: No  Possibility of Pregnancy:  No  Recent antibiotic use: YES- a few weeks ago   Previous vaginitis issues: No  Precipitating or alleviating factors: None  Therapies tried and outcome: none    Was recently in [place]  Started getting vaginal discharge, then dysuria a couple days later  Self treated with monistat  Came in for UA and wet prep - wet prep negative, UA with signs of infection  Treated 4/17 for UTI with Bactrim BID x5 days - UC showed susceptibility  Symptoms improved     Then 5-6 days ago vaginal discharge came back - yellowish tint  Fullness lower abdomen and a little cramping but is now better  Discharge seems to be improving now also  Does use some soap in vaginal area - external only  No douching     No odor  No itching or discomfort in vaginal area  No dysuria, hematuria, frequency, urgency  No concern for STIs, no known exposures, monogamous with     Review of Systems   Genitourinary: Positive for vaginal discharge.      Constitutional, HEENT, cardiovascular, pulmonary, gi and gu systems are negative, except as otherwise noted.      Objective    /70   Pulse 51   Temp 98.4  F (36.9  C)   Resp 18   Ht 1.575 m (5' 2\")   Wt 80.3 kg (177 lb)   LMP 01/03/2014   SpO2 96%   BMI 32.37 kg/m    Body mass index is 32.37 kg/m .  Physical Exam   GENERAL: healthy, alert and no distress   (female): normal female external genitalia, normal urethral meatus, vaginal mucosa pink, moist without discharge. No sores or lesions on external genitalia. Wet prep collected.   SKIN: no suspicious lesions or rashes  NEURO: Normal " strength and tone, mentation intact and speech normal  PSYCH: mentation appears normal, affect normal/bright

## 2023-05-09 LAB
C TRACH DNA SPEC QL NAA+PROBE: NEGATIVE
N GONORRHOEA DNA SPEC QL NAA+PROBE: NEGATIVE

## 2023-05-09 NOTE — TELEPHONE ENCOUNTER
Please see [employer] message and advise.   Pt referring to results from 5/8/23. Some still in process, pt has seen results/message posted so far.    Alessia RODRIGUEZ RN  [facility]

## 2023-05-10 LAB — BACTERIA UR CULT: NO GROWTH

## 2023-05-10 NOTE — TELEPHONE ENCOUNTER
Sent reply via [employer] result note - see note attached to UC and GC results.  Cheyanne Matamoros PA-C on 5/10/2023 at 6:59 AM

## 2023-05-21 DIAGNOSIS — F32.1 MAJOR DEPRESSIVE DISORDER, SINGLE EPISODE, MODERATE (H): ICD-10-CM

## 2023-05-22 RX ORDER — SERTRALINE HYDROCHLORIDE 100 MG/1
TABLET, FILM COATED ORAL
Qty: 45 TABLET | Refills: 0 | Status: SHIPPED | OUTPATIENT
Start: 2023-05-22 | End: 2023-06-27

## 2023-06-01 ENCOUNTER — HEALTH MAINTENANCE LETTER (OUTPATIENT)
Age: 63
End: 2023-06-01

## 2023-06-26 ASSESSMENT — ENCOUNTER SYMPTOMS
CONSTIPATION: 0
PALPITATIONS: 0
WEAKNESS: 0
HEARTBURN: 0
FEVER: 0
FREQUENCY: 0
CHILLS: 0
HEMATOCHEZIA: 0
DIZZINESS: 0
ABDOMINAL PAIN: 0
EYE PAIN: 0
HEADACHES: 0
SORE THROAT: 0
NAUSEA: 0
JOINT SWELLING: 0
SHORTNESS OF BREATH: 0
HEMATURIA: 0
COUGH: 0
DYSURIA: 0
ARTHRALGIAS: 1
MYALGIAS: 1
BREAST MASS: 0
PARESTHESIAS: 0
DIARRHEA: 0

## 2023-06-27 ENCOUNTER — ANCILLARY PROCEDURE (OUTPATIENT)
Dept: MAMMOGRAPHY | Facility: CLINIC | Age: 63
End: 2023-06-27
Payer: COMMERCIAL

## 2023-06-27 ENCOUNTER — OFFICE VISIT (OUTPATIENT)
Dept: FAMILY MEDICINE | Facility: CLINIC | Age: 63
End: 2023-06-27
Payer: COMMERCIAL

## 2023-06-27 VITALS
DIASTOLIC BLOOD PRESSURE: 72 MMHG | OXYGEN SATURATION: 100 % | HEART RATE: 51 BPM | WEIGHT: 177.2 LBS | SYSTOLIC BLOOD PRESSURE: 134 MMHG | BODY MASS INDEX: 32.61 KG/M2 | HEIGHT: 62 IN | RESPIRATION RATE: 18 BRPM | TEMPERATURE: 96.5 F

## 2023-06-27 DIAGNOSIS — M79.641 BILATERAL HAND PAIN: ICD-10-CM

## 2023-06-27 DIAGNOSIS — Z00.00 ANNUAL PHYSICAL EXAM: Primary | ICD-10-CM

## 2023-06-27 DIAGNOSIS — F32.1 MAJOR DEPRESSIVE DISORDER, SINGLE EPISODE, MODERATE (H): ICD-10-CM

## 2023-06-27 DIAGNOSIS — G47.62 NOCTURNAL LEG CRAMPS: ICD-10-CM

## 2023-06-27 DIAGNOSIS — Z12.31 VISIT FOR SCREENING MAMMOGRAM: ICD-10-CM

## 2023-06-27 DIAGNOSIS — G43.109 MIGRAINE WITH AURA AND WITHOUT STATUS MIGRAINOSUS, NOT INTRACTABLE: ICD-10-CM

## 2023-06-27 DIAGNOSIS — F51.04 CHRONIC INSOMNIA: ICD-10-CM

## 2023-06-27 DIAGNOSIS — M79.642 BILATERAL HAND PAIN: ICD-10-CM

## 2023-06-27 LAB — HGB BLD-MCNC: 12 G/DL (ref 11.7–15.7)

## 2023-06-27 PROCEDURE — 77067 SCR MAMMO BI INCL CAD: CPT | Mod: TC | Performed by: STUDENT IN AN ORGANIZED HEALTH CARE EDUCATION/TRAINING PROGRAM

## 2023-06-27 PROCEDURE — 80053 COMPREHEN METABOLIC PANEL: CPT | Performed by: FAMILY MEDICINE

## 2023-06-27 PROCEDURE — 85018 HEMOGLOBIN: CPT | Performed by: FAMILY MEDICINE

## 2023-06-27 PROCEDURE — 84443 ASSAY THYROID STIM HORMONE: CPT | Performed by: FAMILY MEDICINE

## 2023-06-27 PROCEDURE — 99214 OFFICE O/P EST MOD 30 MIN: CPT | Mod: 25 | Performed by: FAMILY MEDICINE

## 2023-06-27 PROCEDURE — 77063 BREAST TOMOSYNTHESIS BI: CPT | Mod: TC | Performed by: STUDENT IN AN ORGANIZED HEALTH CARE EDUCATION/TRAINING PROGRAM

## 2023-06-27 PROCEDURE — 36415 COLL VENOUS BLD VENIPUNCTURE: CPT | Performed by: FAMILY MEDICINE

## 2023-06-27 PROCEDURE — 82728 ASSAY OF FERRITIN: CPT | Performed by: FAMILY MEDICINE

## 2023-06-27 PROCEDURE — 99396 PREV VISIT EST AGE 40-64: CPT | Performed by: FAMILY MEDICINE

## 2023-06-27 PROCEDURE — 80061 LIPID PANEL: CPT | Performed by: FAMILY MEDICINE

## 2023-06-27 PROCEDURE — 83735 ASSAY OF MAGNESIUM: CPT | Performed by: FAMILY MEDICINE

## 2023-06-27 RX ORDER — SUMATRIPTAN 50 MG/1
50 TABLET, FILM COATED ORAL
Qty: 9 TABLET | Refills: 3 | Status: SHIPPED | OUTPATIENT
Start: 2023-06-27

## 2023-06-27 RX ORDER — SERTRALINE HYDROCHLORIDE 25 MG/1
25 TABLET, FILM COATED ORAL DAILY
Qty: 30 TABLET | Refills: 3 | Status: SHIPPED | OUTPATIENT
Start: 2023-06-27 | End: 2023-10-13

## 2023-06-27 RX ORDER — ZOLPIDEM TARTRATE 5 MG/1
TABLET ORAL
Qty: 30 TABLET | Refills: 1 | Status: SHIPPED | OUTPATIENT
Start: 2023-06-27

## 2023-06-27 ASSESSMENT — ENCOUNTER SYMPTOMS
WEAKNESS: 0
HEADACHES: 0
SHORTNESS OF BREATH: 0
DIZZINESS: 0
ARTHRALGIAS: 1
NAUSEA: 0
MYALGIAS: 1
FEVER: 0
HEMATURIA: 0
SORE THROAT: 0
HEMATOCHEZIA: 0
PARESTHESIAS: 0
CHILLS: 0
FREQUENCY: 0
JOINT SWELLING: 0
DYSURIA: 0
ABDOMINAL PAIN: 0
PALPITATIONS: 0
HEARTBURN: 0
BREAST MASS: 0
CONSTIPATION: 0
DIARRHEA: 0
COUGH: 0
EYE PAIN: 0

## 2023-06-27 ASSESSMENT — PATIENT HEALTH QUESTIONNAIRE - PHQ9
SUM OF ALL RESPONSES TO PHQ QUESTIONS 1-9: 2
5. POOR APPETITE OR OVEREATING: NOT AT ALL

## 2023-06-27 ASSESSMENT — ANXIETY QUESTIONNAIRES
6. BECOMING EASILY ANNOYED OR IRRITABLE: NOT AT ALL
2. NOT BEING ABLE TO STOP OR CONTROL WORRYING: NOT AT ALL
1. FEELING NERVOUS, ANXIOUS, OR ON EDGE: SEVERAL DAYS
3. WORRYING TOO MUCH ABOUT DIFFERENT THINGS: NOT AT ALL
GAD7 TOTAL SCORE: 1
5. BEING SO RESTLESS THAT IT IS HARD TO SIT STILL: NOT AT ALL
7. FEELING AFRAID AS IF SOMETHING AWFUL MIGHT HAPPEN: NOT AT ALL
GAD7 TOTAL SCORE: 1

## 2023-06-27 ASSESSMENT — PAIN SCALES - GENERAL: PAINLEVEL: MILD PAIN (2)

## 2023-06-27 NOTE — PROGRESS NOTES
"  Assessment & Plan     Annual physical exam    Fasting labs ordered.  - Lipid panel reflex to direct LDL Fasting; Future  - Comprehensive metabolic panel; Future  - Lipid panel reflex to direct LDL Fasting  - Comprehensive metabolic panel    Bilateral hand pain  Patient's been experiencing bilateral hand pain which leads to occasional feeling of weakness.  Her neuro exam including strength is normal today and there is no inflamed area noted either.  Recommending hand therapy evaluation and management  - Occupational Therapy Referral; Future    Migraine with aura and without status migrainosus, not intractable  Symptoms well managed with occasional use of Imitrex when needed  - SUMAtriptan (IMITREX) 50 MG tablet; Take 1 tablet (50 mg) by mouth at onset of headache for migraine May repeat dose in 2 hours if needed, max 4 tabs per 24 hours    Chronic insomnia  Patient using Ambien once or twice a week as needed  - zolpidem (AMBIEN) 5 MG tablet; TAKE half to 1 TABLET BY MOUTH  AT BEDTIME PRN insomnia    Major depressive disorder, single episode, moderate (H)  Symptoms are well managed.  Recommending to lower the dose from 50 mg of sertraline to 25 mg daily.  In the next 1 month if any worsening of symptoms noted, she is instructed to notify me back.  If symptoms stay well managed.  In the next 2 months or so, we should start weaning her off.  Patient will contact me for that  - sertraline (ZOLOFT) 25 MG tablet; Take 1 tablet (25 mg) by mouth daily    Nocturnal leg cramps  Questionable etiology.  Labs ordered as below.  - Ferritin; Future  - TSH with free T4 reflex; Future  - Hemoglobin; Future  - Magnesium; Future  - Ferritin  - TSH with free T4 reflex  - Hemoglobin  - Magnesium             BMI:   Estimated body mass index is 32.53 kg/m  as calculated from the following:    Height as of this encounter: 1.572 m (5' 1.89\").    Weight as of this encounter: 80.4 kg (177 lb 3.2 oz).           Aurelio Rivera MD  [facility] " [facility] SYLVIA Gruber is a 62 year old, presenting for the following health issues:  Physical (Pt is fasting for blood work.)        6/27/2023    10:53 AM   Additional Questions   Roomed by Yecenia Rogers     Healthy Habits:    Getting at least 3 servings of Calcium per day:  Yes    Bi-annual eye exam:  NO    Dental care twice a year:  NO    Sleep apnea or symptoms of sleep apnea:  Daytime drowsiness    Diet:  Regular (no restrictions)    Frequency of exercise:  1 day/week    Duration of exercise:  30-45 minutes    Taking medications regularly:  Yes    Medication side effects:  None    PHQ-2 Total Score:    Additional concerns today:  Yes       Patient complains of ongoing nocturnal cramps in the legs which can be very painful.  It happens about once or twice a month.  She has had it ongoing for a while now.    Complains of both hands hurting as she continue working with using her hands.  Occasionally she feels weakness.  The other day she went for bowling and the ball slipped out of her hand as she could not hold it.  Denies any numbness.  Denies any joint swelling or stiffness.  No injuries reported.    History of migraine headaches.  Well managed with Imitrex use as needed.  History of chronic insomnia for which she uses Ambien once in a while.      Depression symptoms are well controlled.  She is using sertraline 50 mg once daily.  Denies any anxiety.      Review of Systems   Constitutional: Negative for chills and fever.   HENT: Positive for hearing loss. Negative for congestion, ear pain and sore throat.    Eyes: Positive for visual disturbance. Negative for pain.   Respiratory: Negative for cough and shortness of breath.    Cardiovascular: Negative for chest pain, palpitations and peripheral edema.   Gastrointestinal: Negative for abdominal pain, constipation, diarrhea, heartburn, hematochezia and nausea.   Breasts:  Negative for tenderness, breast mass and discharge.  "  Genitourinary: Negative for dysuria, frequency, genital sores, hematuria, pelvic pain, urgency, vaginal bleeding and vaginal discharge.   Musculoskeletal: Positive for arthralgias and myalgias. Negative for joint swelling.   Skin: Positive for rash.   Neurological: Negative for dizziness, weakness, headaches and paresthesias.   Psychiatric/Behavioral: Negative for mood changes.            Objective    /72   Pulse 51   Temp (!) 96.5  F (35.8  C) (Tympanic)   Resp 18   Ht 1.572 m (5' 1.89\")   Wt 80.4 kg (177 lb 3.2 oz)   LMP 01/03/2014   SpO2 100%   BMI 32.53 kg/m    Body mass index is 32.53 kg/m .  Physical Exam   GENERAL: healthy, alert and no distress  EYES: Eyes grossly normal to inspection, PERRL and conjunctivae and sclerae normal  HENT: ear canals and TM's normal, nose and mouth without ulcers or lesions  NECK: no adenopathy, no asymmetry, masses, or scars and thyroid normal to palpation  RESP: lungs clear to auscultation - no rales, rhonchi or wheezes  BREAST: normal without masses, tenderness or nipple discharge and no palpable axillary masses or adenopathy  CV: regular rate and rhythm, normal S1 S2, no S3 or S4, no murmur, click or rub, no peripheral edema and peripheral pulses strong  ABDOMEN: soft, nontender, no hepatosplenomegaly, no masses and bowel sounds normal  MS: no gross musculoskeletal defects noted, no edema  SKIN: no suspicious lesions or rashes  NEURO: Normal strength and tone, mentation intact and speech normal  PSYCH: mentation appears normal, affect normal/bright                    "

## 2023-06-28 LAB
ALBUMIN SERPL BCG-MCNC: 4.5 G/DL (ref 3.5–5.2)
ALP SERPL-CCNC: 71 U/L (ref 35–104)
ALT SERPL W P-5'-P-CCNC: 20 U/L (ref 0–50)
ANION GAP SERPL CALCULATED.3IONS-SCNC: 10 MMOL/L (ref 7–15)
AST SERPL W P-5'-P-CCNC: 24 U/L (ref 0–45)
BILIRUB SERPL-MCNC: 0.3 MG/DL
BUN SERPL-MCNC: 21.8 MG/DL (ref 8–23)
CALCIUM SERPL-MCNC: 9.7 MG/DL (ref 8.8–10.2)
CHLORIDE SERPL-SCNC: 105 MMOL/L (ref 98–107)
CHOLEST SERPL-MCNC: 242 MG/DL
CREAT SERPL-MCNC: 0.76 MG/DL (ref 0.51–0.95)
DEPRECATED HCO3 PLAS-SCNC: 26 MMOL/L (ref 22–29)
FERRITIN SERPL-MCNC: 93 NG/ML (ref 11–328)
GFR SERPL CREATININE-BSD FRML MDRD: 89 ML/MIN/1.73M2
GLUCOSE SERPL-MCNC: 94 MG/DL (ref 70–99)
HDLC SERPL-MCNC: 78 MG/DL
LDLC SERPL CALC-MCNC: 153 MG/DL
MAGNESIUM SERPL-MCNC: 2 MG/DL (ref 1.7–2.3)
NONHDLC SERPL-MCNC: 164 MG/DL
POTASSIUM SERPL-SCNC: 4.4 MMOL/L (ref 3.4–5.3)
PROT SERPL-MCNC: 7.2 G/DL (ref 6.4–8.3)
SODIUM SERPL-SCNC: 141 MMOL/L (ref 136–145)
TRIGL SERPL-MCNC: 54 MG/DL
TSH SERPL DL<=0.005 MIU/L-ACNC: 1.99 UIU/ML (ref 0.3–4.2)

## 2023-07-19 DIAGNOSIS — F32.1 MAJOR DEPRESSIVE DISORDER, SINGLE EPISODE, MODERATE (H): ICD-10-CM

## 2023-07-19 RX ORDER — SERTRALINE HYDROCHLORIDE 25 MG/1
TABLET, FILM COATED ORAL
Qty: 30 TABLET | Refills: 3 | OUTPATIENT
Start: 2023-07-19

## 2024-05-28 ENCOUNTER — PATIENT OUTREACH (OUTPATIENT)
Dept: CARE COORDINATION | Facility: CLINIC | Age: 64
End: 2024-05-28
Payer: COMMERCIAL

## 2024-06-11 ENCOUNTER — PATIENT OUTREACH (OUTPATIENT)
Dept: CARE COORDINATION | Facility: CLINIC | Age: 64
End: 2024-06-11
Payer: COMMERCIAL

## 2024-06-17 PROBLEM — Z71.89 ADVANCED DIRECTIVES, COUNSELING/DISCUSSION: Status: RESOLVED | Noted: 2017-03-14 | Resolved: 2024-06-17

## 2024-08-11 ENCOUNTER — HEALTH MAINTENANCE LETTER (OUTPATIENT)
Age: 64
End: 2024-08-11

## (undated) RX ORDER — FENTANYL CITRATE 50 UG/ML
INJECTION, SOLUTION INTRAMUSCULAR; INTRAVENOUS
Status: DISPENSED
Start: 2019-01-14